# Patient Record
Sex: MALE | Race: WHITE | NOT HISPANIC OR LATINO | Employment: UNEMPLOYED | URBAN - METROPOLITAN AREA
[De-identification: names, ages, dates, MRNs, and addresses within clinical notes are randomized per-mention and may not be internally consistent; named-entity substitution may affect disease eponyms.]

---

## 2017-08-28 ENCOUNTER — INITIAL CONSULT (OUTPATIENT)
Dept: HEMATOLOGY/ONCOLOGY | Facility: CLINIC | Age: 47
End: 2017-08-28
Payer: MEDICARE

## 2017-08-28 ENCOUNTER — LAB VISIT (OUTPATIENT)
Dept: LAB | Facility: HOSPITAL | Age: 47
End: 2017-08-28
Attending: INTERNAL MEDICINE
Payer: MEDICARE

## 2017-08-28 VITALS
OXYGEN SATURATION: 97 % | TEMPERATURE: 98 F | WEIGHT: 93.94 LBS | DIASTOLIC BLOOD PRESSURE: 70 MMHG | SYSTOLIC BLOOD PRESSURE: 102 MMHG | HEART RATE: 86 BPM

## 2017-08-28 DIAGNOSIS — D64.9 ANEMIA, UNSPECIFIED TYPE: Primary | ICD-10-CM

## 2017-08-28 DIAGNOSIS — E11.8 TYPE 2 DIABETES MELLITUS WITH COMPLICATION, UNSPECIFIED LONG TERM INSULIN USE STATUS: ICD-10-CM

## 2017-08-28 DIAGNOSIS — D64.9 ANEMIA, UNSPECIFIED TYPE: ICD-10-CM

## 2017-08-28 PROBLEM — E11.9 DIABETES MELLITUS: Status: ACTIVE | Noted: 2017-08-28

## 2017-08-28 LAB
ALBUMIN SERPL BCP-MCNC: 3.6 G/DL
ALP SERPL-CCNC: 64 U/L
ALT SERPL W/O P-5'-P-CCNC: 13 U/L
ANION GAP SERPL CALC-SCNC: 6 MMOL/L
AST SERPL-CCNC: 16 U/L
BASOPHILS # BLD AUTO: 0.05 K/UL
BASOPHILS NFR BLD: 1.2 %
BILIRUB SERPL-MCNC: 0.4 MG/DL
BUN SERPL-MCNC: 16 MG/DL
CALCIUM SERPL-MCNC: 9.8 MG/DL
CHLORIDE SERPL-SCNC: 106 MMOL/L
CO2 SERPL-SCNC: 27 MMOL/L
CREAT SERPL-MCNC: 1.3 MG/DL
DIFFERENTIAL METHOD: ABNORMAL
EOSINOPHIL # BLD AUTO: 0.2 K/UL
EOSINOPHIL NFR BLD: 5.2 %
ERYTHROCYTE [DISTWIDTH] IN BLOOD BY AUTOMATED COUNT: 15.9 %
EST. GFR  (AFRICAN AMERICAN): >60 ML/MIN/1.73 M^2
EST. GFR  (NON AFRICAN AMERICAN): >60 ML/MIN/1.73 M^2
FERRITIN SERPL-MCNC: 6 NG/ML
GLUCOSE SERPL-MCNC: 101 MG/DL
HCT VFR BLD AUTO: 31.9 %
HGB BLD-MCNC: 10 G/DL
IRON SERPL-MCNC: 27 UG/DL
LYMPHOCYTES # BLD AUTO: 1.3 K/UL
LYMPHOCYTES NFR BLD: 30.8 %
MCH RBC QN AUTO: 26 PG
MCHC RBC AUTO-ENTMCNC: 31.3 G/DL
MCV RBC AUTO: 83 FL
MONOCYTES # BLD AUTO: 0.5 K/UL
MONOCYTES NFR BLD: 11.1 %
NEUTROPHILS # BLD AUTO: 2.2 K/UL
NEUTROPHILS NFR BLD: 51.7 %
PLATELET # BLD AUTO: 199 K/UL
PMV BLD AUTO: 10.8 FL
POTASSIUM SERPL-SCNC: 4.1 MMOL/L
PROT SERPL-MCNC: 7.5 G/DL
RBC # BLD AUTO: 3.85 M/UL
RETICS/RBC NFR AUTO: 1.1 %
SATURATED IRON: 6 %
SODIUM SERPL-SCNC: 139 MMOL/L
TOTAL IRON BINDING CAPACITY: 487 UG/DL
TRANSFERRIN SERPL-MCNC: 329 MG/DL
WBC # BLD AUTO: 4.22 K/UL

## 2017-08-28 PROCEDURE — 99999 PR PBB SHADOW E&M-NEW PATIENT-LVL III: CPT | Mod: PBBFAC,,, | Performed by: INTERNAL MEDICINE

## 2017-08-28 PROCEDURE — 82728 ASSAY OF FERRITIN: CPT

## 2017-08-28 PROCEDURE — 82525 ASSAY OF COPPER: CPT

## 2017-08-28 PROCEDURE — 99204 OFFICE O/P NEW MOD 45 MIN: CPT | Mod: S$PBB,,, | Performed by: INTERNAL MEDICINE

## 2017-08-28 PROCEDURE — 85025 COMPLETE CBC W/AUTO DIFF WBC: CPT

## 2017-08-28 PROCEDURE — 85045 AUTOMATED RETICULOCYTE COUNT: CPT

## 2017-08-28 PROCEDURE — 84165 PROTEIN E-PHORESIS SERUM: CPT | Mod: 26,,, | Performed by: PATHOLOGY

## 2017-08-28 PROCEDURE — 80053 COMPREHEN METABOLIC PANEL: CPT

## 2017-08-28 PROCEDURE — 84630 ASSAY OF ZINC: CPT

## 2017-08-28 PROCEDURE — 83540 ASSAY OF IRON: CPT

## 2017-08-28 PROCEDURE — 4010F ACE/ARB THERAPY RXD/TAKEN: CPT | Mod: ,,, | Performed by: INTERNAL MEDICINE

## 2017-08-28 PROCEDURE — 83520 IMMUNOASSAY QUANT NOS NONAB: CPT

## 2017-08-28 PROCEDURE — 84165 PROTEIN E-PHORESIS SERUM: CPT

## 2017-08-28 PROCEDURE — 82747 ASSAY OF FOLIC ACID RBC: CPT

## 2017-08-28 RX ORDER — TRIAMCINOLONE ACETONIDE 1 MG/G
OINTMENT TOPICAL
COMMUNITY
Start: 2017-07-31 | End: 2018-04-11

## 2017-08-28 RX ORDER — ATORVASTATIN CALCIUM 10 MG/1
10 TABLET, FILM COATED ORAL DAILY
COMMUNITY
Start: 2017-07-19

## 2017-08-28 RX ORDER — OMEPRAZOLE 20 MG/1
20 CAPSULE, DELAYED RELEASE ORAL 2 TIMES DAILY
COMMUNITY
Start: 2017-08-03 | End: 2018-02-09 | Stop reason: ALTCHOICE

## 2017-08-28 RX ORDER — LIRAGLUTIDE 6 MG/ML
1.8 INJECTION SUBCUTANEOUS DAILY
COMMUNITY
Start: 2017-08-25 | End: 2018-07-11 | Stop reason: ALTCHOICE

## 2017-08-28 RX ORDER — INSULIN GLARGINE 100 [IU]/ML
15 INJECTION, SOLUTION SUBCUTANEOUS NIGHTLY
COMMUNITY
End: 2017-11-15 | Stop reason: ALTCHOICE

## 2017-08-28 RX ORDER — DOXAZOSIN 2 MG/1
2 TABLET ORAL DAILY
COMMUNITY
Start: 2017-08-24 | End: 2018-07-11 | Stop reason: DRUGHIGH

## 2017-08-28 RX ORDER — INSULIN LISPRO 100 [IU]/ML
INJECTION, SOLUTION INTRAVENOUS; SUBCUTANEOUS
COMMUNITY
End: 2019-04-16

## 2017-08-28 RX ORDER — LISINOPRIL 5 MG/1
5 TABLET ORAL DAILY
COMMUNITY
Start: 2017-07-10 | End: 2018-10-11

## 2017-08-28 RX ORDER — PEN NEEDLE, DIABETIC 31 GX5/16"
NEEDLE, DISPOSABLE MISCELLANEOUS
COMMUNITY
Start: 2017-06-13 | End: 2021-03-28

## 2017-08-28 NOTE — LETTER
August 28, 2017      Emil Lopez MD  [address] [place] [address] [address]  Tony C[place]ERO [id number]           [facility]  [address] [place], [place] [address] [address]  Phone: [phone]          Patient: Jarrett Odonnell Jr.   MR Number: [id number]   YOB: 1970   Date of Visit: 8/28/2017       Dear Dr. Emil Lopez:    Thank you for referring Jarrett Odonnell to me for evaluation. Attached you will find relevant portions of my assessment and plan of care.    If you have questions, please do not hesitate to call me. I look forward to following Jarrett Odonnell along with you.    Sincerely,    Emani Rothamn MD    Enclosure  CC:  No Recipients    If you would like to receive this communication electronically, please contact [email or web address] or [phone] to request more information on [facility] access.    For providers and/or their staff who would like to refer a patient to [facility], please contact us through our one-stop-shop provider referral line, Patrica Corley, at [phone].    If you feel you have received this communication in error or would no longer like to receive these types of communications, please e-mail [email or web address]

## 2017-08-28 NOTE — PROGRESS NOTES
Subjective:       Patient ID: Jarrett Odonnell Jr. is a 46 y.o. male.    Chief Complaint: Initial Visit    HPI   REASON FOR CONSULTATION: Anemia  REFERRING PHYSICIAN : Emil Lopez M.D.    Patient is a pleasant 46-year-old gentleman with past medical history of type 2 diabetes mellitus, CK D3, hypertension seen today in consultation for anemia.  He is accompanied by his mother.  She reports he was recently made aware of anemia.  No prior history of anemia according to patient.  No history of blood transfusions.  He reports fatigue for the past 2 months.  No lightheadedness, dizziness, shortness of breath or chest pain.  He reports he underwent a screening colonoscopy in 2010 which was  remarkable for benign polyps.  No melena, hematochezia change in bowel habits.  Appetite and weight stable.  No fevers or night sweats.  CBC from 8/24/2017 reveals a white blood cell count of 4100/MM 3 hemoglobin of 8.9 g/dL hematocrit of 28.6% MCV of 82.4 and a platelet count of 190 3K . He is here for further evaluation    PAST MEDICAL HISTORY type 2 diabetes mellitus with diabetic chronic kidney disease, hypertensive chronic kidney disease, chronic kidney disease stage III      PAST SURGICAL HISTORY : Orchiectomy     SOCIAL HISTORY : No  history of tobacco use.  No history of ETOH use.  He lives with his parents.  He attends [employer] school.  No smoke or alcohol use      Review of Systems   Constitutional: Positive for fatigue. Negative for appetite change, fever and unexpected weight change.   HENT: Negative for mouth sores.    Eyes: Negative for visual disturbance.   Respiratory: Negative for cough and shortness of breath.    Cardiovascular: Negative for chest pain.   Gastrointestinal: Negative for abdominal pain and diarrhea.   Genitourinary: Negative for frequency.   Musculoskeletal: Negative for back pain.   Skin: Negative for rash.   Neurological: Negative for headaches.   Hematological: Negative for adenopathy.    Psychiatric/Behavioral: The patient is not nervous/anxious.        Objective:       Vitals:    08/28/17 1054   BP: 102/70   BP Location: Left arm   Patient Position: Sitting   BP Method: Medium (Manual)   Pulse: 86   Temp: 98 °F (36.7 °C)   TempSrc: Oral   SpO2: 97%   Weight: 42.6 kg (93 lb 14.7 oz)         Physical Exam   Constitutional: He is oriented to person, place, and time. He appears well-developed and well-nourished.   HENT:   Head: Normocephalic.   Mouth/Throat: Oropharynx is clear and moist. No oropharyngeal exudate.   Eyes: Conjunctivae are normal. No scleral icterus.   Neck: Normal range of motion. Neck supple. No thyromegaly present.   Cardiovascular: Normal rate, regular rhythm and normal heart sounds.    No murmur heard.  Pulmonary/Chest: Effort normal and breath sounds normal. He has no wheezes. He has no rales.   Abdominal: Soft. Bowel sounds are normal. He exhibits no distension and no mass. There is no hepatosplenomegaly. There is no tenderness. There is no rebound and no guarding.   Musculoskeletal: Normal range of motion. He exhibits no edema.   Lymphadenopathy:     He has no cervical adenopathy.     He has no axillary adenopathy.        Right: No supraclavicular adenopathy present.        Left: No supraclavicular adenopathy present.   Neurological: He is alert and oriented to person, place, and time. No cranial nerve deficit.   Skin: No rash noted. No erythema.   Psychiatric: He has a normal mood and affect.       Outside Facility labs Reviewed.  8/24/2017 BUN 17 Creatinine 1.27 Sodium 140 Potassium 3.7 Chloride 106 Calcium 8.7 Phosphorous 2.3 Hemoglobin 8.9 Hematocrit 28.6% plt ct t 190K from   3/7/20/17 Hemoglobin 10.3 g/dL  Hematocrit 31.7%    Assessment:       1. Anemia, unspecified type    2. Type 2 diabetes mellitus with complication, unspecified long term insulin use status        Plan:   In summary, 46-year-old with history type 2 diabetes mellitus with diabetic chronic kidney  disease,Chronic kidney disease stage III with  anemia normocytic of unknown etiology and duration. According to  available records, patient has a mild anemia dating back to at least 2016 with a hemoglobin 11.1g/dl  which has been trending to recent hemoglobin level level of 8.9 g/dL.  Biochemical profile reveals a  relatively  stable BUN and creatinine level with a creatinine level ranging in the 1.2-1.4 mg/dL. Plan testing today including iron studies, SPEP, serum free light chains, zinc, copper, red blood cell folate, CBC, CMP and reticulocyte count. In  addition, plan to evaluate the peripheral blood smear for any abnormalities. He  will follow-up approximate 2 weeks to review the results of the workup.  All questions posed answered to patient's satisfaction.    Thank you for allowing me to participate care of your patient    Cc Emil Montejo M.D.

## 2017-08-29 LAB
ALBUMIN SERPL ELPH-MCNC: 3.94 G/DL
ALPHA1 GLOB SERPL ELPH-MCNC: 0.34 G/DL
ALPHA2 GLOB SERPL ELPH-MCNC: 0.91 G/DL
B-GLOBULIN SERPL ELPH-MCNC: 0.86 G/DL
GAMMA GLOB SERPL ELPH-MCNC: 1.26 G/DL
KAPPA LC SER QL IA: 4.7 MG/DL
KAPPA LC/LAMBDA SER IA: 1.4
LAMBDA LC SER QL IA: 3.35 MG/DL
PATHOLOGIST INTERPRETATION SPE: NORMAL
PROT SERPL-MCNC: 7.3 G/DL

## 2017-08-30 LAB
FOLATE RBC-MCNC: 696 NG/ML
ZINC SERPL-MCNC: 62 UG/DL (ref 60–130)

## 2017-08-31 LAB — COPPER SERPL-MCNC: 1239 UG/L (ref 665–1480)

## 2017-09-15 ENCOUNTER — OFFICE VISIT (OUTPATIENT)
Dept: HEMATOLOGY/ONCOLOGY | Facility: CLINIC | Age: 47
End: 2017-09-15
Payer: MEDICARE

## 2017-09-15 ENCOUNTER — TELEPHONE (OUTPATIENT)
Dept: HEMATOLOGY/ONCOLOGY | Facility: CLINIC | Age: 47
End: 2017-09-15

## 2017-09-15 VITALS
DIASTOLIC BLOOD PRESSURE: 48 MMHG | SYSTOLIC BLOOD PRESSURE: 100 MMHG | WEIGHT: 93.56 LBS | TEMPERATURE: 98 F | OXYGEN SATURATION: 97 % | HEART RATE: 84 BPM

## 2017-09-15 DIAGNOSIS — D50.9 IRON DEFICIENCY ANEMIA, UNSPECIFIED IRON DEFICIENCY ANEMIA TYPE: Primary | ICD-10-CM

## 2017-09-15 PROCEDURE — 99213 OFFICE O/P EST LOW 20 MIN: CPT | Mod: S$PBB,,, | Performed by: INTERNAL MEDICINE

## 2017-09-15 PROCEDURE — 99213 OFFICE O/P EST LOW 20 MIN: CPT | Mod: PBBFAC | Performed by: INTERNAL MEDICINE

## 2017-09-15 PROCEDURE — 99999 PR PBB SHADOW E&M-EST. PATIENT-LVL III: CPT | Mod: PBBFAC,,, | Performed by: INTERNAL MEDICINE

## 2017-09-15 RX ORDER — HEPARIN 100 UNIT/ML
500 SYRINGE INTRAVENOUS
Status: CANCELLED | OUTPATIENT
Start: 2017-09-19

## 2017-09-15 RX ORDER — SODIUM CHLORIDE 0.9 % (FLUSH) 0.9 %
10 SYRINGE (ML) INJECTION
Status: CANCELLED | OUTPATIENT
Start: 2017-09-26

## 2017-09-15 RX ORDER — HEPARIN 100 UNIT/ML
500 SYRINGE INTRAVENOUS
Status: CANCELLED | OUTPATIENT
Start: 2017-09-26

## 2017-09-15 RX ORDER — SODIUM CHLORIDE 0.9 % (FLUSH) 0.9 %
10 SYRINGE (ML) INJECTION
Status: CANCELLED | OUTPATIENT
Start: 2017-09-19

## 2017-09-15 NOTE — Clinical Note
Follow-up with Dr. Rodriguez- KELI and lost to follow-up Cbc, FE studies proir to f/u  Infectafer x2 Note from Dr. Pfeiffer ( faxed?)

## 2017-09-15 NOTE — PROGRESS NOTES
Subjective:       Patient ID: Jarrett Odonnell Jr. is a 47 y.o. male.    Chief Complaint: Follow-up    HPI   Diagnosis: KELI      47-year-old gentleman with past medical history of type 2 diabetes mellitus, CK D3, hypertension seen today for f/u for anemia  He is accompanied by his mother.  She reports he was recently made aware of anemia.  No prior history of anemia according to patient.  No history of blood transfusions.  He reports fatigue for the past 2 months.  No lightheadedness, dizziness, shortness of breath or chest pain.  He reports he underwent a screening colonoscopy in 2010 which was  remarkable for benign polyps.  No melena, hematochezia change in bowel habits.  Appetite and weight stable.  No fevers or night sweats.  CBC from 8/24/2017 reveals a white blood cell count of 4100/MM 3 hemoglobin of 8.9 g/dL hematocrit of 28.6% MCV of 82.4 and a platelet count of 190 3K .       Follow-up with Vascular surgery planned 9/19/2017    Today, he is doing well  Less fatigued( according to mother)  No SOB/CP/N/V  No melena, hematochezia or change in bowel habits       Fe parameters abnl- Ferritin 6  PAST MEDICAL HISTORY type 2 diabetes mellitus with diabetic chronic kidney disease, hypertensive chronic kidney disease, chronic kidney disease stage III      PAST SURGICAL HISTORY : Orchiectomy     SOCIAL HISTORY : No  history of tobacco use.  No history of ETOH use.  He lives with his parents.  He attends [employer].  No smoke or alcohol use      Review of Systems   Constitutional: Positive for fatigue. Negative for appetite change, fever and unexpected weight change.   HENT: Negative for mouth sores.    Eyes: Negative for visual disturbance.   Respiratory: Negative for cough and shortness of breath.    Cardiovascular: Negative for chest pain.   Gastrointestinal: Negative for abdominal pain and diarrhea.   Genitourinary: Negative for frequency.   Musculoskeletal: Negative for back pain.   Skin: Negative for  rash.   Neurological: Negative for headaches.   Hematological: Negative for adenopathy.   Psychiatric/Behavioral: The patient is not nervous/anxious.        Objective:       Vitals:    09/15/17 0957 09/15/17 1003   BP: (!) 101/49 (!) 100/48   BP Location: Right arm Right arm   Patient Position: Sitting Sitting   BP Method: Medium (Automatic) Medium (Manual)   Pulse: 84    Temp: 98.2 °F (36.8 °C)    TempSrc: Oral    SpO2: 97%    Weight: 42.4 kg (93 lb 9.4 oz)          Physical Exam   Constitutional: He is oriented to person, place, and time. He appears well-developed and well-nourished.   HENT:   Head: Normocephalic.   Mouth/Throat: Oropharynx is clear and moist. No oropharyngeal exudate.   Eyes: Conjunctivae are normal. No scleral icterus.   Neck: Normal range of motion. Neck supple. No thyromegaly present.   Cardiovascular: Normal rate, regular rhythm and normal heart sounds.    No murmur heard.  Pulmonary/Chest: Effort normal and breath sounds normal. He has no wheezes. He has no rales.   Abdominal: Soft. Bowel sounds are normal. He exhibits no distension and no mass. There is no hepatosplenomegaly. There is no tenderness. There is no rebound and no guarding.   Musculoskeletal: Normal range of motion. He exhibits no edema.   Lymphadenopathy:     He has no cervical adenopathy.     He has no axillary adenopathy.        Right: No supraclavicular adenopathy present.        Left: No supraclavicular adenopathy present.   Neurological: He is alert and oriented to person, place, and time. No cranial nerve deficit.   Skin: No rash noted. No erythema.   Psychiatric: He has a normal mood and affect.       Outside Facility labs Reviewed.  8/24/2017 BUN 17 Creatinine 1.27 Sodium 140 Potassium 3.7 Chloride 106 Calcium 8.7 Phosphorous 2.3 Hemoglobin 8.9 Hematocrit 28.6% plt ct t 190K from   3/7/20/17 Hemoglobin 10.3 g/dL  Hematocrit 31.7%    SPEP-nl    Results for JOHN MCGRATH JR. (MRN [id number]) as of 9/15/2017 10:06   Ref.  Range 8/28/2017 12:00   [place] Free Light Chains Latest Ref Range: 0.33 - 1.94 mg/dL 4.70 (H)   Lambda Free Light Chains Latest Ref Range: 0.57 - 2.63 mg/dL 3.35 (H)   Kappa/Lambda FLC Ratio Latest Ref Range: 0.26 - 1.65  1.40     Lab Results   Component Value Date    IRON 27 (L) 08/28/2017    TIBC 487 (H) 08/28/2017    FERRITIN 6 (L) 08/28/2017     Results for JOHN MCGRATH JRPeterson (MRN [id number]) as of 9/15/2017 10:06   Ref. Range 8/28/2017 12:00   Copper Latest Ref Range: 665 - 1480 ug/L 1239   Differential Method Unknown Automated   RBC Folate Latest Ref Range: 280 - 791 ng/mL 696   Zinc, Serum-ALT Latest Ref Range: 60 - 130 ug/dL 62     Lab Results   Component Value Date    WBC 4.22 08/28/2017    HGB 10.0 (L) 08/28/2017    HCT 31.9 (L) 08/28/2017    MCV 83 08/28/2017     08/28/2017       Assessment:       1. Iron deficiency anemia, unspecified iron deficiency anemia type        Plan:   46-year-old with history type 2 diabetes mellitus with diabetic chronic kidney disease,Chronic kidney disease stage III   Pt has a mild anemia dating back to at least 2016 with a hemoglobin 11.1g/dl  which has been trending to recent hemoglobin level level of 8.9 g/dL.    Biochemical profile reveals a  relatively  stable BUN and creatinine level with a creatinine level ranging in the 1.2-1.4 mg/dL.  Fe parameters reveal KELI  Plan IV Fe therapy with Injectafer  Pt followed by Dr. Rodriguez- last f/u 2014(Follow-up colonoscopy planned in 1yr)    Follow-up with Dr. Rodriguez for GI eval    Follow-up in 2mos with cbc,Fe studies prior to f/u     Cc Emil Montejo M.D.        Alpesh Rodriguez II, MD

## 2017-09-15 NOTE — TELEPHONE ENCOUNTER
Called pt's mother & gave her the follow up appt for  with  on October 17th at 11am, she voiced understanding.

## 2017-09-20 ENCOUNTER — INFUSION (OUTPATIENT)
Dept: INFUSION THERAPY | Facility: HOSPITAL | Age: 47
End: 2017-09-20
Attending: INTERNAL MEDICINE
Payer: MEDICARE

## 2017-09-20 VITALS — RESPIRATION RATE: 17 BRPM | DIASTOLIC BLOOD PRESSURE: 71 MMHG | HEART RATE: 78 BPM | SYSTOLIC BLOOD PRESSURE: 130 MMHG

## 2017-09-20 DIAGNOSIS — D50.9 IRON DEFICIENCY ANEMIA, UNSPECIFIED IRON DEFICIENCY ANEMIA TYPE: Primary | ICD-10-CM

## 2017-09-20 PROCEDURE — 96365 THER/PROPH/DIAG IV INF INIT: CPT

## 2017-09-20 PROCEDURE — 63600175 PHARM REV CODE 636 W HCPCS: Performed by: INTERNAL MEDICINE

## 2017-09-20 PROCEDURE — 25000003 PHARM REV CODE 250: Performed by: INTERNAL MEDICINE

## 2017-09-20 RX ADMIN — FERRIC CARBOXYMALTOSE INJECTION 750 MG: 50 INJECTION, SOLUTION INTRAVENOUS at 01:09

## 2017-09-20 NOTE — PLAN OF CARE
Problem: Patient Care Overview  Goal: Plan of Care Review  Outcome: Ongoing (interventions implemented as appropriate)  Patient received Injectafer. Tolerated well. No reactions noted during visit. VSS. Patient received discharge instructions and verbalized understanding.

## 2017-09-27 ENCOUNTER — INFUSION (OUTPATIENT)
Dept: INFUSION THERAPY | Facility: HOSPITAL | Age: 47
End: 2017-09-27
Attending: INTERNAL MEDICINE
Payer: MEDICARE

## 2017-09-27 VITALS — HEART RATE: 85 BPM | DIASTOLIC BLOOD PRESSURE: 72 MMHG | RESPIRATION RATE: 16 BRPM | SYSTOLIC BLOOD PRESSURE: 124 MMHG

## 2017-09-27 DIAGNOSIS — D50.9 IRON DEFICIENCY ANEMIA, UNSPECIFIED IRON DEFICIENCY ANEMIA TYPE: Primary | ICD-10-CM

## 2017-09-27 PROCEDURE — 96374 THER/PROPH/DIAG INJ IV PUSH: CPT

## 2017-09-27 PROCEDURE — 25000003 PHARM REV CODE 250: Performed by: INTERNAL MEDICINE

## 2017-09-27 PROCEDURE — 63600175 PHARM REV CODE 636 W HCPCS: Performed by: INTERNAL MEDICINE

## 2017-09-27 RX ADMIN — FERRIC CARBOXYMALTOSE INJECTION 750 MG: 50 INJECTION, SOLUTION INTRAVENOUS at 01:09

## 2017-11-13 ENCOUNTER — LAB VISIT (OUTPATIENT)
Dept: LAB | Facility: HOSPITAL | Age: 47
End: 2017-11-13
Attending: INTERNAL MEDICINE
Payer: MEDICARE

## 2017-11-13 DIAGNOSIS — D50.9 IRON DEFICIENCY ANEMIA, UNSPECIFIED IRON DEFICIENCY ANEMIA TYPE: ICD-10-CM

## 2017-11-13 LAB
BASOPHILS # BLD AUTO: 0.02 K/UL
BASOPHILS NFR BLD: 0.7 %
DIFFERENTIAL METHOD: ABNORMAL
EOSINOPHIL # BLD AUTO: 0.1 K/UL
EOSINOPHIL NFR BLD: 4.5 %
ERYTHROCYTE [DISTWIDTH] IN BLOOD BY AUTOMATED COUNT: 20.4 %
FERRITIN SERPL-MCNC: 335 NG/ML
HCT VFR BLD AUTO: 36.7 %
HGB BLD-MCNC: 12.3 G/DL
IRON SERPL-MCNC: 94 UG/DL
LYMPHOCYTES # BLD AUTO: 0.8 K/UL
LYMPHOCYTES NFR BLD: 28 %
MCH RBC QN AUTO: 31.4 PG
MCHC RBC AUTO-ENTMCNC: 33.5 G/DL
MCV RBC AUTO: 94 FL
MONOCYTES # BLD AUTO: 0.3 K/UL
MONOCYTES NFR BLD: 9.1 %
NEUTROPHILS # BLD AUTO: 1.7 K/UL
NEUTROPHILS NFR BLD: 57.7 %
PLATELET # BLD AUTO: 150 K/UL
PMV BLD AUTO: 10.9 FL
RBC # BLD AUTO: 3.92 M/UL
SATURATED IRON: 36 %
TOTAL IRON BINDING CAPACITY: 263 UG/DL
TRANSFERRIN SERPL-MCNC: 178 MG/DL
WBC # BLD AUTO: 2.86 K/UL

## 2017-11-13 PROCEDURE — 36415 COLL VENOUS BLD VENIPUNCTURE: CPT

## 2017-11-13 PROCEDURE — 85025 COMPLETE CBC W/AUTO DIFF WBC: CPT

## 2017-11-13 PROCEDURE — 82728 ASSAY OF FERRITIN: CPT

## 2017-11-13 PROCEDURE — 83540 ASSAY OF IRON: CPT

## 2017-11-15 ENCOUNTER — OFFICE VISIT (OUTPATIENT)
Dept: HEMATOLOGY/ONCOLOGY | Facility: CLINIC | Age: 47
End: 2017-11-15
Payer: MEDICARE

## 2017-11-15 VITALS
DIASTOLIC BLOOD PRESSURE: 78 MMHG | SYSTOLIC BLOOD PRESSURE: 123 MMHG | WEIGHT: 90.19 LBS | OXYGEN SATURATION: 97 % | HEART RATE: 71 BPM | TEMPERATURE: 98 F

## 2017-11-15 DIAGNOSIS — D50.9 IRON DEFICIENCY ANEMIA, UNSPECIFIED IRON DEFICIENCY ANEMIA TYPE: Primary | ICD-10-CM

## 2017-11-15 PROCEDURE — 99999 PR PBB SHADOW E&M-EST. PATIENT-LVL III: CPT | Mod: PBBFAC,,, | Performed by: INTERNAL MEDICINE

## 2017-11-15 PROCEDURE — 99213 OFFICE O/P EST LOW 20 MIN: CPT | Mod: S$PBB,,, | Performed by: INTERNAL MEDICINE

## 2017-11-15 PROCEDURE — 99213 OFFICE O/P EST LOW 20 MIN: CPT | Mod: PBBFAC | Performed by: INTERNAL MEDICINE

## 2017-11-15 RX ORDER — INSULIN GLARGINE 100 [IU]/ML
INJECTION, SOLUTION SUBCUTANEOUS
COMMUNITY
Start: 2017-08-28 | End: 2017-11-15 | Stop reason: ALTCHOICE

## 2017-11-15 NOTE — PROGRESS NOTES
Subjective:       Patient ID: Jarrett Odonnell Jr. is a 47 y.o. male.    Chief Complaint: Follow-up    HPI   Diagnosis: KELI      47-year-old gentleman with past medical history of type 2 diabetes mellitus, CK D3, hypertension seen today for f/u for KELI  He is accompanied by his mother.  She reports he was recently made aware of anemia.  No prior history of anemia according to patient.  No history of blood transfusions.  He reports fatigue for the past 2 months.  No lightheadedness, dizziness, shortness of breath or chest pain.  He reports he underwent a screening colonoscopy in 2010 which was  remarkable for benign polyps.  No melena, hematochezia change in bowel habits.  Appetite and weight stable.  No fevers or night sweats.  CBC from 8/24/2017 reveals a white blood cell count of 4100/MM 3 hemoglobin of 8.9 g/dL hematocrit of 28.6% MCV of 82.4 and a platelet count of 190 3K .           Today, he is doing well  Less fatigued  No SOB/CP/N/V  No melena, hematochezia or change in bowel habits     S/p Injectafer completed 9/2017    Fe parameters improved    Pt s/p GI eval 10/2017  EGD- nl esophagus, gastric polyp  Colonoscopy -hmds, o/w unremarkable  PAST MEDICAL HISTORY type 2 diabetes mellitus with diabetic chronic kidney disease, hypertensive chronic kidney disease, chronic kidney disease stage III      PAST SURGICAL HISTORY : Orchiectomy     SOCIAL HISTORY : No  history of tobacco use.  No history of ETOH use.  He lives with his parents.  He attends [employer] in 2014[employer] school.  No smoke or alcohol use      Review of Systems   Constitutional: Positive for fatigue. Negative for appetite change, fever and unexpected weight change.   HENT: Negative for mouth sores.    Eyes: Negative for visual disturbance.   Respiratory: Negative for cough and shortness of breath.    Cardiovascular: Negative for chest pain.   Gastrointestinal: Negative for abdominal pain and diarrhea.   Genitourinary: Negative for frequency.    Musculoskeletal: Negative for back pain.   Skin: Negative for rash.   Neurological: Negative for headaches.   Hematological: Negative for adenopathy.   Psychiatric/Behavioral: The patient is not nervous/anxious.        Objective:       Vitals:    11/15/17 1035   BP: 123/78   BP Location: Left arm   Patient Position: Sitting   BP Method: Medium (Automatic)   Pulse: 71   Temp: 98 °F (36.7 °C)   TempSrc: Oral   SpO2: 97%   Weight: 40.9 kg (90 lb 2.7 oz)         Physical Exam   Constitutional: He is oriented to person, place, and time. He appears well-developed and well-nourished.   HENT:   Head: Normocephalic.   Mouth/Throat: Oropharynx is clear and moist. No oropharyngeal exudate.   Eyes: Conjunctivae are normal. No scleral icterus.   Neck: Normal range of motion. Neck supple. No thyromegaly present.   Cardiovascular: Normal rate, regular rhythm and normal heart sounds.    No murmur heard.  Pulmonary/Chest: Effort normal and breath sounds normal. He has no wheezes. He has no rales.   Abdominal: Soft. Bowel sounds are normal. He exhibits no distension and no mass. There is no hepatosplenomegaly. There is no tenderness. There is no rebound and no guarding.   Musculoskeletal: Normal range of motion. He exhibits no edema.   Lymphadenopathy:     He has no cervical adenopathy.     He has no axillary adenopathy.        Right: No supraclavicular adenopathy present.        Left: No supraclavicular adenopathy present.   Neurological: He is alert and oriented to person, place, and time. No cranial nerve deficit.   Skin: No rash noted. No erythema.   Psychiatric: He has a normal mood and affect.       Outside Facility labs Reviewed.  8/24/2017 BUN 17 Creatinine 1.27 Sodium 140 Potassium 3.7 Chloride 106 Calcium 8.7 Phosphorous 2.3 Hemoglobin 8.9 Hematocrit 28.6% plt ct t 190K from   3/7/20/17 Hemoglobin 10.3 g/dL  Hematocrit 31.7%    SPEP-nl    Results for JOHN MCGRATH JR. (MRN [id number]) as of 9/15/2017 10:06   Ref. Range  8/28/2017 12:00   [place] Free Light Chains Latest Ref Range: 0.33 - 1.94 mg/dL 4.70 (H)   Lambda Free Light Chains Latest Ref Range: 0.57 - 2.63 mg/dL 3.35 (H)   Kappa/Lambda FLC Ratio Latest Ref Range: 0.26 - 1.65  1.40     Lab Results   Component Value Date    IRON 94 11/13/2017    TIBC 263 11/13/2017    FERRITIN 335 (H) 11/13/2017     Results for JOHN MCGRATH  (MRN [id number]) as of 9/15/2017 10:06   Ref. Range 8/28/2017 12:00   Copper Latest Ref Range: 665 - 1480 ug/L 1239   Differential Method Unknown Automated   RBC Folate Latest Ref Range: 280 - 791 ng/mL 696   Zinc, Serum-ALT Latest Ref Range: 60 - 130 ug/dL 62     Lab Results   Component Value Date    WBC 2.86 (L) 11/13/2017    HGB 12.3 (L) 11/13/2017    HCT 36.7 (L) 11/13/2017    MCV 94 11/13/2017     11/13/2017       Assessment:       1. Iron deficiency anemia, unspecified iron deficiency anemia type        Plan:   46-year-old with history type 2 diabetes mellitus with diabetic chronic kidney disease,Chronic kidney disease stage III   Pt has a mild anemia dating back to at least 2016 with a hemoglobin 11.1g/dl  which has been trending to recent hemoglobin level level of 8.9 g/dL.    Biochemical profile reveals a  relatively  stable BUN and creatinine level with a creatinine level ranging in the 1.2-1.4 mg/dL.    S/p IV Fe therapy with Injectafer  Fe parameters improved  Followed by GI -Dr. Rodriguez  S/p GI eval- no bleeding source  Cont to monitor     Follow-up in 2mos with cbc,Fe studies prior to f/u     Cc Emil Montejo M.D.        Alpesh Rodriguez II, MD

## 2018-01-12 ENCOUNTER — LAB VISIT (OUTPATIENT)
Dept: LAB | Facility: HOSPITAL | Age: 48
End: 2018-01-12
Attending: INTERNAL MEDICINE
Payer: MEDICARE

## 2018-01-12 DIAGNOSIS — D50.9 IRON DEFICIENCY ANEMIA, UNSPECIFIED IRON DEFICIENCY ANEMIA TYPE: ICD-10-CM

## 2018-01-12 LAB
BASOPHILS # BLD AUTO: 0.03 K/UL
BASOPHILS NFR BLD: 0.7 %
DIFFERENTIAL METHOD: ABNORMAL
EOSINOPHIL # BLD AUTO: 0.2 K/UL
EOSINOPHIL NFR BLD: 3.6 %
ERYTHROCYTE [DISTWIDTH] IN BLOOD BY AUTOMATED COUNT: 13.5 %
FERRITIN SERPL-MCNC: 409 NG/ML
HCT VFR BLD AUTO: 34.4 %
HGB BLD-MCNC: 11.7 G/DL
IRON SERPL-MCNC: 79 UG/DL
LYMPHOCYTES # BLD AUTO: 1.1 K/UL
LYMPHOCYTES NFR BLD: 24.7 %
MCH RBC QN AUTO: 33.5 PG
MCHC RBC AUTO-ENTMCNC: 34 G/DL
MCV RBC AUTO: 99 FL
MONOCYTES # BLD AUTO: 0.5 K/UL
MONOCYTES NFR BLD: 11.7 %
NEUTROPHILS # BLD AUTO: 2.6 K/UL
NEUTROPHILS NFR BLD: 59.3 %
PLATELET # BLD AUTO: 174 K/UL
PMV BLD AUTO: 11.5 FL
RBC # BLD AUTO: 3.49 M/UL
SATURATED IRON: 30 %
TOTAL IRON BINDING CAPACITY: 260 UG/DL
TRANSFERRIN SERPL-MCNC: 176 MG/DL
WBC # BLD AUTO: 4.45 K/UL

## 2018-01-12 PROCEDURE — 36415 COLL VENOUS BLD VENIPUNCTURE: CPT

## 2018-01-12 PROCEDURE — 83540 ASSAY OF IRON: CPT

## 2018-01-12 PROCEDURE — 85025 COMPLETE CBC W/AUTO DIFF WBC: CPT

## 2018-01-12 PROCEDURE — 82728 ASSAY OF FERRITIN: CPT

## 2018-02-09 ENCOUNTER — OFFICE VISIT (OUTPATIENT)
Dept: HEMATOLOGY/ONCOLOGY | Facility: CLINIC | Age: 48
End: 2018-02-09
Payer: MEDICARE

## 2018-02-09 VITALS
WEIGHT: 91.5 LBS | TEMPERATURE: 98 F | SYSTOLIC BLOOD PRESSURE: 110 MMHG | DIASTOLIC BLOOD PRESSURE: 64 MMHG | HEART RATE: 82 BPM | OXYGEN SATURATION: 97 %

## 2018-02-09 DIAGNOSIS — D50.9 IRON DEFICIENCY ANEMIA, UNSPECIFIED IRON DEFICIENCY ANEMIA TYPE: Primary | ICD-10-CM

## 2018-02-09 PROCEDURE — 99213 OFFICE O/P EST LOW 20 MIN: CPT | Mod: PBBFAC | Performed by: INTERNAL MEDICINE

## 2018-02-09 PROCEDURE — 99999 PR PBB SHADOW E&M-EST. PATIENT-LVL III: CPT | Mod: PBBFAC,,, | Performed by: INTERNAL MEDICINE

## 2018-02-09 PROCEDURE — 99213 OFFICE O/P EST LOW 20 MIN: CPT | Mod: S$PBB,,, | Performed by: INTERNAL MEDICINE

## 2018-02-09 NOTE — PROGRESS NOTES
Subjective:       Patient ID: Jarrett Odonnell Jr. is a 47 y.o. male.    Chief Complaint: No chief complaint on file.    HPI   Diagnosis: KELI      47-year-old gentleman with past medical history of type 2 diabetes mellitus, CK D3, hypertension seen today for f/u for KELI  He is accompanied by his mother.  She reports he was recently made aware of anemia.  No prior history of anemia according to patient.  No history of blood transfusions.  He reports fatigue for the past 2 months.  No lightheadedness, dizziness, shortness of breath or chest pain.  He reports he underwent a screening colonoscopy in 2010 which was  remarkable for benign polyps.  No melena, hematochezia change in bowel habits.  Appetite and weight stable.  No fevers or night sweats.  CBC from 8/24/2017 reveals a white blood cell count of 4100/MM 3 hemoglobin of 8.9 g/dL hematocrit of 28.6% MCV of 82.4 and a platelet count of 190 3K .       Accompanied by mother    Today, he is doing well  No fatigue  No SOB/CP  Does not crave ice  No melena, hematochezia or change in bowel habits     S/p Injectafer completed 9/2017    Fe parameters improved    Pt s/p GI eval 10/2017  EGD- nl esophagus, gastric polyp  Colonoscopy -ds, o/w unremarkable  PAST MEDICAL HISTORY type 2 diabetes mellitus with diabetic chronic kidney disease, hypertensive chronic kidney disease, chronic kidney disease stage III      PAST SURGICAL HISTORY : Orchiectomy     SOCIAL HISTORY : No  history of tobacco use.  No history of ETOH use.  He lives with his parents.  He attends [employer].  No smoke or alcohol use      Review of Systems   Constitutional: Positive for fatigue. Negative for appetite change, fever and unexpected weight change.   HENT: Negative for mouth sores.    Eyes: Negative for visual disturbance.   Respiratory: Negative for cough and shortness of breath.    Cardiovascular: Negative for chest pain.   Gastrointestinal: Negative for abdominal pain and diarrhea.    Genitourinary: Negative for frequency.   Musculoskeletal: Negative for back pain.   Skin: Negative for rash.   Neurological: Negative for headaches.   Hematological: Negative for adenopathy.   Psychiatric/Behavioral: The patient is not nervous/anxious.        Objective:       Vitals:    02/09/18 1419   BP: 110/64   BP Location: Right arm   Patient Position: Sitting   BP Method: Small (Manual)   Pulse: 82   Temp: 98.3 °F (36.8 °C)   TempSrc: Oral   SpO2: 97%   Weight: 41.5 kg (91 lb 7.9 oz)         Physical Exam   Constitutional: He is oriented to person, place, and time. He appears well-developed and well-nourished.   HENT:   Head: Normocephalic.   Mouth/Throat: Oropharynx is clear and moist. No oropharyngeal exudate.   Eyes: Conjunctivae are normal. No scleral icterus.   Neck: Normal range of motion. Neck supple. No thyromegaly present.   Cardiovascular: Normal rate, regular rhythm and normal heart sounds.    No murmur heard.  Pulmonary/Chest: Effort normal and breath sounds normal. He has no wheezes. He has no rales.   Abdominal: Soft. Bowel sounds are normal. He exhibits no distension and no mass. There is no hepatosplenomegaly. There is no tenderness. There is no rebound and no guarding.   Musculoskeletal: Normal range of motion. He exhibits no edema.   Lymphadenopathy:     He has no cervical adenopathy.     He has no axillary adenopathy.        Right: No supraclavicular adenopathy present.        Left: No supraclavicular adenopathy present.   Neurological: He is alert and oriented to person, place, and time. No cranial nerve deficit.   Skin: No rash noted. No erythema.   Psychiatric: He has a normal mood and affect.       Outside Facility labs Reviewed.  8/24/2017 BUN 17 Creatinine 1.27 Sodium 140 Potassium 3.7 Chloride 106 Calcium 8.7 Phosphorous 2.3 Hemoglobin 8.9 Hematocrit 28.6% plt ct t 190K from   3/7/20/17 Hemoglobin 10.3 g/dL  Hematocrit 31.7%    SPEP-nl    Results for JOHN MCGRATH JR. (MRN  [id number]) as of 9/15/2017 10:06   Ref. Range 8/28/2017 12:00   [place] Free Light Chains Latest Ref Range: 0.33 - 1.94 mg/dL 4.70 (H)   Lambda Free Light Chains Latest Ref Range: 0.57 - 2.63 mg/dL 3.35 (H)   Kappa/Lambda FLC Ratio Latest Ref Range: 0.26 - 1.65  1.40     Lab Results   Component Value Date    IRON 79 01/12/2018    TIBC 260 01/12/2018    FERRITIN 409 (H) 01/12/2018     Results for JOHN MCGRATH  (MRN [id number]) as of 9/15/2017 10:06   Ref. Range 8/28/2017 12:00   Copper Latest Ref Range: 665 - 1480 ug/L 1239   Differential Method Unknown Automated   RBC Folate Latest Ref Range: 280 - 791 ng/mL 696   Zinc, Serum-ALT Latest Ref Range: 60 - 130 ug/dL 62     Lab Results   Component Value Date    WBC 4.45 01/12/2018    HGB 11.7 (L) 01/12/2018    HCT 34.4 (L) 01/12/2018    MCV 99 (H) 01/12/2018     01/12/2018       Assessment:       1. Iron deficiency anemia, unspecified iron deficiency anemia type        Plan:   46-year-old with history type 2 diabetes mellitus with diabetic chronic kidney disease,Chronic kidney disease stage III   Pt has a mild anemia dating back to at least 2016 with a hemoglobin 11.1g/dl .  Hb 11.7g/dL  S/p IV Fe therapy with Injectafer completed 9/2017   Fe parameters improved  Followed by GI -Dr. Rodriguez  S/p GI eval- no bleeding source  Cont to monitor     Follow-up in 2mos with cbc,Fe studies prior to f/u     Cc Emil Montejo M.D.        Alpesh Rodriguez II, MD

## 2018-04-09 ENCOUNTER — LAB VISIT (OUTPATIENT)
Dept: LAB | Facility: HOSPITAL | Age: 48
End: 2018-04-09
Attending: INTERNAL MEDICINE
Payer: MEDICARE

## 2018-04-09 DIAGNOSIS — D50.9 IRON DEFICIENCY ANEMIA, UNSPECIFIED IRON DEFICIENCY ANEMIA TYPE: ICD-10-CM

## 2018-04-09 LAB
BASOPHILS # BLD AUTO: 0.04 K/UL
BASOPHILS NFR BLD: 0.9 %
DIFFERENTIAL METHOD: ABNORMAL
EOSINOPHIL # BLD AUTO: 0.3 K/UL
EOSINOPHIL NFR BLD: 6 %
ERYTHROCYTE [DISTWIDTH] IN BLOOD BY AUTOMATED COUNT: 12.1 %
FERRITIN SERPL-MCNC: 361 NG/ML
HCT VFR BLD AUTO: 33.9 %
HGB BLD-MCNC: 11.5 G/DL
IRON SERPL-MCNC: 83 UG/DL
LYMPHOCYTES # BLD AUTO: 1.3 K/UL
LYMPHOCYTES NFR BLD: 27.4 %
MCH RBC QN AUTO: 33.7 PG
MCHC RBC AUTO-ENTMCNC: 33.9 G/DL
MCV RBC AUTO: 99 FL
MONOCYTES # BLD AUTO: 0.5 K/UL
MONOCYTES NFR BLD: 11.1 %
NEUTROPHILS # BLD AUTO: 2.6 K/UL
NEUTROPHILS NFR BLD: 54.6 %
PLATELET # BLD AUTO: 150 K/UL
PMV BLD AUTO: 11.5 FL
RBC # BLD AUTO: 3.41 M/UL
SATURATED IRON: 29 %
TOTAL IRON BINDING CAPACITY: 287 UG/DL
TRANSFERRIN SERPL-MCNC: 194 MG/DL
WBC # BLD AUTO: 4.7 K/UL

## 2018-04-09 PROCEDURE — 85025 COMPLETE CBC W/AUTO DIFF WBC: CPT

## 2018-04-09 PROCEDURE — 83540 ASSAY OF IRON: CPT

## 2018-04-09 PROCEDURE — 36415 COLL VENOUS BLD VENIPUNCTURE: CPT

## 2018-04-09 PROCEDURE — 82728 ASSAY OF FERRITIN: CPT

## 2018-04-11 ENCOUNTER — OFFICE VISIT (OUTPATIENT)
Dept: HEMATOLOGY/ONCOLOGY | Facility: CLINIC | Age: 48
End: 2018-04-11
Payer: MEDICARE

## 2018-04-11 VITALS
WEIGHT: 89.06 LBS | OXYGEN SATURATION: 97 % | DIASTOLIC BLOOD PRESSURE: 66 MMHG | TEMPERATURE: 98 F | SYSTOLIC BLOOD PRESSURE: 116 MMHG | HEART RATE: 84 BPM

## 2018-04-11 DIAGNOSIS — D50.9 IRON DEFICIENCY ANEMIA, UNSPECIFIED IRON DEFICIENCY ANEMIA TYPE: Primary | ICD-10-CM

## 2018-04-11 PROCEDURE — 99213 OFFICE O/P EST LOW 20 MIN: CPT | Mod: PBBFAC | Performed by: INTERNAL MEDICINE

## 2018-04-11 PROCEDURE — 99213 OFFICE O/P EST LOW 20 MIN: CPT | Mod: S$PBB,,, | Performed by: INTERNAL MEDICINE

## 2018-04-11 PROCEDURE — 99999 PR PBB SHADOW E&M-EST. PATIENT-LVL III: CPT | Mod: PBBFAC,,, | Performed by: INTERNAL MEDICINE

## 2018-04-11 NOTE — PROGRESS NOTES
Subjective:       Patient ID: Jarrett Odonnell Jr. is a 47 y.o. male.    Chief Complaint: No chief complaint on file.    HPI   Diagnosis: KELI      47-year-old gentleman with past medical history of type 2 diabetes mellitus, CK D3, hypertension seen today for f/u for KELI  He is accompanied by his mother.  She reports he was recently made aware of anemia.  No prior history of anemia according to patient.  No history of blood transfusions.  He reports fatigue for the past 2 months.  No lightheadedness, dizziness, shortness of breath or chest pain.  He reports he underwent a screening colonoscopy in 2010 which was  remarkable for benign polyps.  No melena, hematochezia change in bowel habits.  Appetite and weight stable.  No fevers or night sweats.  CBC from 8/24/2017 reveals a white blood cell count of 4100/MM 3 hemoglobin of 8.9 g/dL hematocrit of 28.6% MCV of 82.4 and a platelet count of 190 3K .     S/p Injectafer completed 9/2017    Accompanied by mother    Today, he is doing well  No new issues  No fatigue  No SOB/CP  No lightheadedness  Does not crave ice  No melena, hematochezia or change in bowel habits       Pt s/p GI eval 10/2017  EGD- nl esophagus, gastric polyp  Colonoscopy -[facility], o/w unremarkable  PAST MEDICAL HISTORY type 2 diabetes mellitus with diabetic chronic kidney disease, hypertensive chronic kidney disease, chronic kidney disease stage III      PAST SURGICAL HISTORY : Orchiectomy     SOCIAL HISTORY : No  history of tobacco use.  No history of ETOH use.  He lives with his parents.  He attends [employer].  No smoke or alcohol use      Review of Systems   Constitutional: Positive for fatigue. Negative for appetite change, fever and unexpected weight change.   HENT: Negative for mouth sores.    Eyes: Negative for visual disturbance.   Respiratory: Negative for cough and shortness of breath.    Cardiovascular: Negative for chest pain.   Gastrointestinal: Negative for abdominal pain and  diarrhea.   Genitourinary: Negative for frequency.   Musculoskeletal: Negative for back pain.   Skin: Negative for rash.   Neurological: Negative for headaches.   Hematological: Negative for adenopathy.   Psychiatric/Behavioral: The patient is not nervous/anxious.        Objective:       Vitals:    04/11/18 1534   BP: 116/66   BP Location: Left arm   Patient Position: Sitting   BP Method: Small (Automatic)   Pulse: 84   Temp: 98.2 °F (36.8 °C)   TempSrc: Oral   SpO2: 97%   Weight: 40.4 kg (89 lb 1.1 oz)         Physical Exam   Constitutional: He is oriented to person, place, and time. He appears well-developed and well-nourished.   HENT:   Head: Normocephalic.   Mouth/Throat: Oropharynx is clear and moist. No oropharyngeal exudate.   Eyes: Conjunctivae are normal. No scleral icterus.   Neck: Normal range of motion. Neck supple. No thyromegaly present.   Cardiovascular: Normal rate, regular rhythm and normal heart sounds.    No murmur heard.  Pulmonary/Chest: Effort normal and breath sounds normal. He has no wheezes. He has no rales.   Abdominal: Soft. Bowel sounds are normal. He exhibits no distension and no mass. There is no hepatosplenomegaly. There is no tenderness. There is no rebound and no guarding.   Musculoskeletal: Normal range of motion. He exhibits no edema.   Lymphadenopathy:     He has no cervical adenopathy.     He has no axillary adenopathy.        Right: No supraclavicular adenopathy present.        Left: No supraclavicular adenopathy present.   Neurological: He is alert and oriented to person, place, and time. No cranial nerve deficit.   Skin: No rash noted. No erythema.   Psychiatric: He has a normal mood and affect.       Outside Facility labs Reviewed.  8/24/2017 BUN 17 Creatinine 1.27 Sodium 140 Potassium 3.7 Chloride 106 Calcium 8.7 Phosphorous 2.3 Hemoglobin 8.9 Hematocrit 28.6% plt ct t 190K from   3/7/20/17 Hemoglobin 10.3 g/dL  Hematocrit 31.7%    SPEP-nl    Results for JOHN MCGRATH JR.  (MRN [id number]) as of 9/15/2017 10:06   Ref. Range 8/28/2017 12:00   [place] Free Light Chains Latest Ref Range: 0.33 - 1.94 mg/dL 4.70 (H)   Lambda Free Light Chains Latest Ref Range: 0.57 - 2.63 mg/dL 3.35 (H)   Kappa/Lambda FLC Ratio Latest Ref Range: 0.26 - 1.65  1.40     Lab Results   Component Value Date    IRON 83 04/09/2018    TIBC 287 04/09/2018    FERRITIN 361 (H) 04/09/2018     Results for JOHN MCGRATH JR. (MRN [id number]) as of 9/15/2017 10:06   Ref. Range 8/28/2017 12:00   Copper Latest Ref Range: 665 - 1480 ug/L 1239   Differential Method Unknown Automated   RBC Folate Latest Ref Range: 280 - 791 ng/mL 696   Zinc, Serum-ALT Latest Ref Range: 60 - 130 ug/dL 62     Lab Results   Component Value Date    WBC 4.70 04/09/2018    HGB 11.5 (L) 04/09/2018    HCT 33.9 (L) 04/09/2018    MCV 99 (H) 04/09/2018     04/09/2018       Assessment:       1. Iron deficiency anemia, unspecified iron deficiency anemia type        Plan:   46-year-old with history type 2 diabetes mellitus with diabetic chronic kidney disease,Chronic kidney disease stage III   Pt has a mild anemia dating back to at least 2016 with a hemoglobin 11.1g/dl .  Hb 11.5g/dL  S/p IV Fe therapy with Injectafer completed 9/2017   Fe parameters OK   Followed by GI -Dr. Rodriguez  S/p GI eval- no bleeding source  Cont to monitor     Follow-up in 3mos with cbc,Fe studies prior to f/u     Cc Emil Montejo M.D.        Alpesh Rodriguez II, MD

## 2018-07-09 ENCOUNTER — LAB VISIT (OUTPATIENT)
Dept: LAB | Facility: HOSPITAL | Age: 48
End: 2018-07-09
Attending: INTERNAL MEDICINE
Payer: MEDICARE

## 2018-07-09 DIAGNOSIS — D50.9 IRON DEFICIENCY ANEMIA, UNSPECIFIED IRON DEFICIENCY ANEMIA TYPE: ICD-10-CM

## 2018-07-09 LAB
BASOPHILS # BLD AUTO: 0.03 K/UL
BASOPHILS NFR BLD: 0.8 %
DIFFERENTIAL METHOD: ABNORMAL
EOSINOPHIL # BLD AUTO: 0.2 K/UL
EOSINOPHIL NFR BLD: 5.5 %
ERYTHROCYTE [DISTWIDTH] IN BLOOD BY AUTOMATED COUNT: 12.3 %
FERRITIN SERPL-MCNC: 304 NG/ML
HCT VFR BLD AUTO: 32 %
HGB BLD-MCNC: 11 G/DL
IRON SERPL-MCNC: 75 UG/DL
LYMPHOCYTES # BLD AUTO: 1 K/UL
LYMPHOCYTES NFR BLD: 28.7 %
MCH RBC QN AUTO: 34.7 PG
MCHC RBC AUTO-ENTMCNC: 34.4 G/DL
MCV RBC AUTO: 101 FL
MONOCYTES # BLD AUTO: 0.4 K/UL
MONOCYTES NFR BLD: 12.1 %
NEUTROPHILS # BLD AUTO: 1.9 K/UL
NEUTROPHILS NFR BLD: 52.9 %
PLATELET # BLD AUTO: 138 K/UL
PMV BLD AUTO: 11.9 FL
RBC # BLD AUTO: 3.17 M/UL
SATURATED IRON: 27 %
TOTAL IRON BINDING CAPACITY: 274 UG/DL
TRANSFERRIN SERPL-MCNC: 185 MG/DL
WBC # BLD AUTO: 3.63 K/UL

## 2018-07-09 PROCEDURE — 83540 ASSAY OF IRON: CPT

## 2018-07-09 PROCEDURE — 85025 COMPLETE CBC W/AUTO DIFF WBC: CPT

## 2018-07-09 PROCEDURE — 82728 ASSAY OF FERRITIN: CPT

## 2018-07-09 PROCEDURE — 36415 COLL VENOUS BLD VENIPUNCTURE: CPT

## 2018-07-11 ENCOUNTER — OFFICE VISIT (OUTPATIENT)
Dept: HEMATOLOGY/ONCOLOGY | Facility: CLINIC | Age: 48
End: 2018-07-11
Payer: MEDICARE

## 2018-07-11 VITALS
HEIGHT: 63 IN | SYSTOLIC BLOOD PRESSURE: 102 MMHG | WEIGHT: 88.88 LBS | HEART RATE: 67 BPM | OXYGEN SATURATION: 97 % | BODY MASS INDEX: 15.75 KG/M2 | DIASTOLIC BLOOD PRESSURE: 86 MMHG | TEMPERATURE: 98 F

## 2018-07-11 DIAGNOSIS — D50.9 IRON DEFICIENCY ANEMIA, UNSPECIFIED IRON DEFICIENCY ANEMIA TYPE: Primary | ICD-10-CM

## 2018-07-11 PROCEDURE — 99213 OFFICE O/P EST LOW 20 MIN: CPT | Mod: PBBFAC | Performed by: INTERNAL MEDICINE

## 2018-07-11 PROCEDURE — 99213 OFFICE O/P EST LOW 20 MIN: CPT | Mod: S$PBB,,, | Performed by: INTERNAL MEDICINE

## 2018-07-11 PROCEDURE — 99999 PR PBB SHADOW E&M-EST. PATIENT-LVL III: CPT | Mod: PBBFAC,,, | Performed by: INTERNAL MEDICINE

## 2018-07-11 RX ORDER — DOXAZOSIN 4 MG/1
TABLET ORAL
COMMUNITY
Start: 2018-07-05

## 2018-07-11 RX ORDER — TRIAMCINOLONE ACETONIDE 1 MG/G
OINTMENT TOPICAL
COMMUNITY
Start: 2018-06-20 | End: 2018-10-11

## 2018-07-11 RX ORDER — INSULIN GLARGINE 100 [IU]/ML
INJECTION, SOLUTION SUBCUTANEOUS NIGHTLY
COMMUNITY
End: 2019-04-16

## 2018-07-11 NOTE — PROGRESS NOTES
Subjective:       Patient ID: Jarrett Odonnell Jr. is a 47 y.o. male.    Chief Complaint: Follow-up    HPI   Diagnosis: KELI      47-year-old gentleman with past medical history of type 2 diabetes mellitus, CK D3, hypertension seen today for f/u for KELI  He is accompanied by his mother.  She reports he was recently made aware of anemia.  No prior history of anemia according to patient.  No history of blood transfusions.  He reports fatigue for the past 2 months.  No lightheadedness, dizziness, shortness of breath or chest pain.  He reports he underwent a screening colonoscopy in 2010 which was  remarkable for benign polyps.  No melena, hematochezia change in bowel habits.  Appetite and weight stable.  No fevers or night sweats.  CBC from 8/24/2017 reveals a white blood cell count of 4100/MM 3 hemoglobin of 8.9 g/dL hematocrit of 28.6% MCV of 82.4 and a platelet count of 190 3K .     S/p Injectafer completed 9/2017    Accompanied by mother    Today, he is doing well  No new issues  He is in good spirits   His diabetic meds and antihypertensive meds have been adjusted recently   No fatigue  No SOB/CP  No lightheadedness  No melena, hematochezia or change in bowel habits       Pt s/p GI eval 10/2017  EGD- nl esophagus, gastric polyp  Colonoscopy -[facility], o/w unremarkable  PAST MEDICAL HISTORY type 2 diabetes mellitus with diabetic chronic kidney disease, hypertensive chronic kidney disease, chronic kidney disease stage III      PAST SURGICAL HISTORY : Orchiectomy     SOCIAL HISTORY : No  history of tobacco use.  No history of ETOH use.  He lives with his parents.  He attends [employer].  No smoke or alcohol use      Review of Systems   Constitutional: Positive for fatigue. Negative for appetite change, fever and unexpected weight change.   HENT: Negative for mouth sores.    Eyes: Negative for visual disturbance.   Respiratory: Negative for cough and shortness of breath.    Cardiovascular: Negative for chest pain.  "  Gastrointestinal: Negative for abdominal pain and diarrhea.   Genitourinary: Negative for frequency.   Musculoskeletal: Negative for back pain.   Skin: Negative for rash.   Neurological: Negative for headaches.   Hematological: Negative for adenopathy.   Psychiatric/Behavioral: The patient is not nervous/anxious.        Objective:       Vitals:    07/11/18 1035   BP: 102/86   BP Location: Right arm   Patient Position: Sitting   BP Method: Medium (Automatic)   Pulse: 67   Temp: 97.5 °F (36.4 °C)   TempSrc: Oral   SpO2: 97%   Weight: 40.3 kg (88 lb 13.5 oz)   Height: 5' 3" (1.6 m)         Physical Exam   Constitutional: He is oriented to person, place, and time. He appears well-developed and well-nourished.   HENT:   Head: Normocephalic.   Mouth/Throat: Oropharynx is clear and moist. No oropharyngeal exudate.   Eyes: Conjunctivae are normal. No scleral icterus.   Neck: Normal range of motion. Neck supple. No thyromegaly present.   Cardiovascular: Normal rate, regular rhythm and normal heart sounds.    No murmur heard.  Pulmonary/Chest: Effort normal and breath sounds normal. He has no wheezes. He has no rales.   Abdominal: Soft. Bowel sounds are normal. He exhibits no distension and no mass. There is no hepatosplenomegaly. There is no tenderness. There is no rebound and no guarding.   Musculoskeletal: Normal range of motion. He exhibits no edema.   Lymphadenopathy:     He has no cervical adenopathy.     He has no axillary adenopathy.        Right: No supraclavicular adenopathy present.        Left: No supraclavicular adenopathy present.   Neurological: He is alert and oriented to person, place, and time. No cranial nerve deficit.   Skin: No rash noted. No erythema.   Psychiatric: He has a normal mood and affect.       Outside Facility labs Reviewed.  8/24/2017 BUN 17 Creatinine 1.27 Sodium 140 Potassium 3.7 Chloride 106 Calcium 8.7 Phosphorous 2.3 Hemoglobin 8.9 Hematocrit 28.6% plt ct t 190K from   3/7/20/17 " Hemoglobin 10.3 g/dL  Hematocrit 31.7%    SPEP-nl    Results for JOHN MCGRATH JR. (MRN [id number]) as of 9/15/2017 10:06   Ref. Range 8/28/2017 12:00   [place] Free Light Chains Latest Ref Range: 0.33 - 1.94 mg/dL 4.70 (H)   Lambda Free Light Chains Latest Ref Range: 0.57 - 2.63 mg/dL 3.35 (H)   Kappa/Lambda FLC Ratio Latest Ref Range: 0.26 - 1.65  1.40     Lab Results   Component Value Date    IRON 75 07/09/2018    TIBC 274 07/09/2018    FERRITIN 304 (H) 07/09/2018     Results for JOHN MCGRATH JR. (MRN [id number]) as of 9/15/2017 10:06   Ref. Range 8/28/2017 12:00   Copper Latest Ref Range: 665 - 1480 ug/L 1239   Differential Method Unknown Automated   RBC Folate Latest Ref Range: 280 - 791 ng/mL 696   Zinc, Serum-ALT Latest Ref Range: 60 - 130 ug/dL 62     Lab Results   Component Value Date    WBC 3.63 (L) 07/09/2018    HGB 11.0 (L) 07/09/2018    HCT 32.0 (L) 07/09/2018     (H) 07/09/2018     (L) 07/09/2018       Assessment:       1. Iron deficiency anemia, unspecified iron deficiency anemia type        Plan:   46-year-old with history type 2 diabetes mellitus with diabetic chronic kidney disease,Chronic kidney disease stage III   Pt has a mild anemia dating back to at least 2016 with a hemoglobin 11.1g/dl .  Hb 11g/dL  S/p IV Fe therapy with Injectafer completed 9/2017   Fe parameters OK   S/p GI eval ( Dr. Rodriguez) - no bleeding source  Cont to monitor     Follow-up in 3mos with cbc,Fe studies prior to f/u     Cc Emil Montejo M.D.        Alpesh Rodriguez II, MD

## 2018-08-15 DIAGNOSIS — R10.9 STOMACH ACHE: Primary | ICD-10-CM

## 2018-08-15 DIAGNOSIS — R10.9 AP (ABDOMINAL PAIN): ICD-10-CM

## 2018-08-29 ENCOUNTER — HOSPITAL ENCOUNTER (OUTPATIENT)
Dept: RADIOLOGY | Facility: HOSPITAL | Age: 48
Discharge: HOME | End: 2018-08-29
Attending: SURGERY
Payer: MEDICARE

## 2018-08-29 DIAGNOSIS — R10.9 AP (ABDOMINAL PAIN): ICD-10-CM

## 2018-08-29 PROCEDURE — 78226 HEPATOBILIARY SYSTEM IMAGING: CPT | Mod: 26,,, | Performed by: RADIOLOGY

## 2018-08-29 PROCEDURE — A9537 TC99M MEBROFENIN: HCPCS

## 2018-09-18 ENCOUNTER — LAB VISIT (OUTPATIENT)
Dept: LAB | Facility: HOSPITAL | Age: 48
End: 2018-09-18
Attending: SURGERY
Payer: MEDICARE

## 2018-09-18 DIAGNOSIS — R10.9 STOMACH ACHE: Primary | ICD-10-CM

## 2018-09-18 LAB
ALBUMIN SERPL BCP-MCNC: 3.8 G/DL
ALP SERPL-CCNC: 69 U/L
ALT SERPL W/O P-5'-P-CCNC: 24 U/L
AST SERPL-CCNC: 21 U/L
BILIRUB DIRECT SERPL-MCNC: 0.1 MG/DL
BILIRUB SERPL-MCNC: 0.4 MG/DL
PROT SERPL-MCNC: 7.1 G/DL

## 2018-09-18 PROCEDURE — 36415 COLL VENOUS BLD VENIPUNCTURE: CPT

## 2018-09-18 PROCEDURE — 80076 HEPATIC FUNCTION PANEL: CPT

## 2018-09-26 ENCOUNTER — HOSPITAL ENCOUNTER (OUTPATIENT)
Dept: RADIOLOGY | Facility: HOSPITAL | Age: 48
Discharge: HOME | End: 2018-09-26
Attending: SURGERY
Payer: MEDICARE

## 2018-09-26 DIAGNOSIS — R10.9 AP (ABDOMINAL PAIN): ICD-10-CM

## 2018-09-26 PROCEDURE — 76705 ECHO EXAM OF ABDOMEN: CPT | Mod: 26,,, | Performed by: RADIOLOGY

## 2018-09-26 PROCEDURE — 76705 ECHO EXAM OF ABDOMEN: CPT | Mod: TC

## 2018-10-02 ENCOUNTER — LAB VISIT (OUTPATIENT)
Dept: LAB | Facility: HOSPITAL | Age: 48
End: 2018-10-02
Attending: INTERNAL MEDICINE
Payer: MEDICARE

## 2018-10-02 DIAGNOSIS — D50.9 IRON DEFICIENCY ANEMIA, UNSPECIFIED IRON DEFICIENCY ANEMIA TYPE: ICD-10-CM

## 2018-10-02 LAB
BASOPHILS # BLD AUTO: 0.02 K/UL
BASOPHILS NFR BLD: 0.4 %
DIFFERENTIAL METHOD: ABNORMAL
EOSINOPHIL # BLD AUTO: 0.3 K/UL
EOSINOPHIL NFR BLD: 5.7 %
ERYTHROCYTE [DISTWIDTH] IN BLOOD BY AUTOMATED COUNT: 11.7 %
FERRITIN SERPL-MCNC: 256 NG/ML
HCT VFR BLD AUTO: 36.8 %
HGB BLD-MCNC: 12.6 G/DL
IRON SERPL-MCNC: 59 UG/DL
LYMPHOCYTES # BLD AUTO: 0.9 K/UL
LYMPHOCYTES NFR BLD: 20.5 %
MCH RBC QN AUTO: 34.1 PG
MCHC RBC AUTO-ENTMCNC: 34.2 G/DL
MCV RBC AUTO: 100 FL
MONOCYTES # BLD AUTO: 0.7 K/UL
MONOCYTES NFR BLD: 14.2 %
NEUTROPHILS # BLD AUTO: 2.7 K/UL
NEUTROPHILS NFR BLD: 59.2 %
PLATELET # BLD AUTO: 154 K/UL
PMV BLD AUTO: 11.4 FL
RBC # BLD AUTO: 3.7 M/UL
SATURATED IRON: 22 %
TOTAL IRON BINDING CAPACITY: 274 UG/DL
TRANSFERRIN SERPL-MCNC: 185 MG/DL
WBC # BLD AUTO: 4.59 K/UL

## 2018-10-02 PROCEDURE — 83540 ASSAY OF IRON: CPT

## 2018-10-02 PROCEDURE — 36415 COLL VENOUS BLD VENIPUNCTURE: CPT

## 2018-10-02 PROCEDURE — 85025 COMPLETE CBC W/AUTO DIFF WBC: CPT

## 2018-10-02 PROCEDURE — 82728 ASSAY OF FERRITIN: CPT

## 2018-10-11 ENCOUNTER — OFFICE VISIT (OUTPATIENT)
Dept: HEMATOLOGY/ONCOLOGY | Facility: CLINIC | Age: 48
End: 2018-10-11
Payer: MEDICARE

## 2018-10-11 VITALS
OXYGEN SATURATION: 96 % | TEMPERATURE: 99 F | HEIGHT: 64 IN | HEART RATE: 76 BPM | SYSTOLIC BLOOD PRESSURE: 138 MMHG | DIASTOLIC BLOOD PRESSURE: 42 MMHG | BODY MASS INDEX: 15.72 KG/M2 | WEIGHT: 92.06 LBS

## 2018-10-11 DIAGNOSIS — D50.9 IRON DEFICIENCY ANEMIA, UNSPECIFIED IRON DEFICIENCY ANEMIA TYPE: Primary | ICD-10-CM

## 2018-10-11 PROCEDURE — 99999 PR PBB SHADOW E&M-EST. PATIENT-LVL IV: CPT | Mod: PBBFAC,,, | Performed by: INTERNAL MEDICINE

## 2018-10-11 PROCEDURE — 99214 OFFICE O/P EST MOD 30 MIN: CPT | Mod: PBBFAC | Performed by: INTERNAL MEDICINE

## 2018-10-11 PROCEDURE — 99213 OFFICE O/P EST LOW 20 MIN: CPT | Mod: S$PBB,,, | Performed by: INTERNAL MEDICINE

## 2018-10-11 RX ORDER — NATEGLINIDE 60 MG/1
120 TABLET ORAL
COMMUNITY
Start: 2018-09-14

## 2018-10-11 NOTE — PROGRESS NOTES
Subjective:       Patient ID: Jarrett Odonnell Jr. is a 48 y.o. male.    Chief Complaint: Follow-up    HPI   Diagnosis: KELI      89-year-old gentleman with past medical history of type 2 diabetes mellitus, CK D3, hypertension seen today for f/u for KELI  He is accompanied by his mother.  She reports he was recently made aware of anemia.  No prior history of anemia according to patient.  No history of blood transfusions.  He reports fatigue for the past 2 months.  No lightheadedness, dizziness, shortness of breath or chest pain.  He reports he underwent a screening colonoscopy in 2010 which was  remarkable for benign polyps.  No melena, hematochezia change in bowel habits.  Appetite and weight stable.  No fevers or night sweats.  CBC from 8/24/2017 reveals a white blood cell count of 4100/MM 3 hemoglobin of 8.9 g/dL hematocrit of 28.6% MCV of 82.4 and a platelet count of 190 3K .     S/p Injectafer completed 9/2017    Accompanied by mother    Today, he is doing well  No new issues  He is in good spirits   Appetite and weight stable  No fatigue  No SOB/CP  No melena, hematochezia or change in bowel habits       Pt s/p GI eval 10/2017  EGD- nl esophagus, gastric polyp  Colonoscopy -[facility], o/w unremarkable  PAST MEDICAL HISTORY type 2 diabetes mellitus with diabetic chronic kidney disease, hypertensive chronic kidney disease, chronic kidney disease stage III      PAST SURGICAL HISTORY : Orchiectomy     SOCIAL HISTORY : No  history of tobacco use.  No history of ETOH use.  He lives with his parents.  He attends [employer].  No smoke or alcohol use      Review of Systems   Constitutional: Negative for appetite change, fatigue, fever and unexpected weight change.   HENT: Negative for mouth sores.    Eyes: Negative for visual disturbance.   Respiratory: Negative for cough and shortness of breath.    Cardiovascular: Negative for chest pain.   Gastrointestinal: Negative for abdominal pain and diarrhea.  "  Genitourinary: Negative for frequency.   Musculoskeletal: Negative for back pain.   Skin: Negative for rash.   Neurological: Negative for headaches.   Hematological: Negative for adenopathy.   Psychiatric/Behavioral: The patient is not nervous/anxious.        Objective:       Vitals:    10/11/18 1432 10/11/18 1436   BP: (!) 142/82 (!) 138/42   BP Location: Right arm Right arm   Patient Position: Sitting Sitting   BP Method: Medium (Automatic) Medium (Manual)   Pulse: 76    Temp: 98.5 °F (36.9 °C)    TempSrc: Oral    SpO2: 96%    Weight: 41.7 kg (92 lb 0.7 oz)    Height: 5' 3.5" (1.613 m)          Physical Exam   Constitutional: He is oriented to person, place, and time. He appears well-developed and well-nourished.   HENT:   Head: Normocephalic.   Mouth/Throat: Oropharynx is clear and moist. No oropharyngeal exudate.   Eyes: Conjunctivae are normal. No scleral icterus.   Neck: Normal range of motion. Neck supple. No thyromegaly present.   Cardiovascular: Normal rate, regular rhythm and normal heart sounds.   No murmur heard.  Pulmonary/Chest: Effort normal and breath sounds normal. He has no wheezes. He has no rales.   Abdominal: Soft. Bowel sounds are normal. He exhibits no distension and no mass. There is no hepatosplenomegaly. There is no tenderness. There is no rebound and no guarding.   Musculoskeletal: Normal range of motion. He exhibits no edema.   Lymphadenopathy:     He has no cervical adenopathy.     He has no axillary adenopathy.        Right: No supraclavicular adenopathy present.        Left: No supraclavicular adenopathy present.   Neurological: He is alert and oriented to person, place, and time. No cranial nerve deficit.   Skin: No rash noted. No erythema.   Psychiatric: He has a normal mood and affect.       Outside Facility labs Reviewed.  8/24/2017 BUN 17 Creatinine 1.27 Sodium 140 Potassium 3.7 Chloride 106 Calcium 8.7 Phosphorous 2.3 Hemoglobin 8.9 Hematocrit 28.6% plt ct t 190K from   " 3/7/20/17 Hemoglobin 10.3 g/dL  Hematocrit 31.7%    SPEP-nl    Results for JOHN MCGRATH JR. (MRN [id number]) as of 9/15/2017 10:06   Ref. Range 8/28/2017 12:00   [place] Free Light Chains Latest Ref Range: 0.33 - 1.94 mg/dL 4.70 (H)   Lambda Free Light Chains Latest Ref Range: 0.57 - 2.63 mg/dL 3.35 (H)   Kappa/Lambda FLC Ratio Latest Ref Range: 0.26 - 1.65  1.40     Lab Results   Component Value Date    IRON 59 10/02/2018    TIBC 274 10/02/2018    FERRITIN 256 10/02/2018     Results for JOHN MCGRATH JR. (MRN [id number]) as of 9/15/2017 10:06   Ref. Range 8/28/2017 12:00   Copper Latest Ref Range: 665 - 1480 ug/L 1239   Differential Method Unknown Automated   RBC Folate Latest Ref Range: 280 - 791 ng/mL 696   Zinc, Serum-ALT Latest Ref Range: 60 - 130 ug/dL 62     Lab Results   Component Value Date    WBC 4.59 10/02/2018    HGB 12.6 (L) 10/02/2018    HCT 36.8 (L) 10/02/2018     (H) 10/02/2018     10/02/2018     Lab Results   Component Value Date    IRON 59 10/02/2018    TIBC 274 10/02/2018    FERRITIN 256 10/02/2018       Assessment:       1. Iron deficiency anemia, unspecified iron deficiency anemia type        Plan:   46-year-old with history type 2 diabetes mellitus with diabetic chronic kidney disease,Chronic kidney disease stage III   Pt has a mild anemia dating back to at least 2016 with a hemoglobin 11.1g/dl .  Hb 12.6 g/dL  S/p IV Fe therapy with Injectafer completed 9/2017   Fe parameters OK   S/p GI eval ( Dr. Rodriguez) - no bleeding source  Cont to monitor     Follow-up in 4mos with cbc,Fe studies prior to f/u     Cc Emil Montejo M.D.        Alpesh Rodriguez II, MD

## 2019-04-09 ENCOUNTER — LAB VISIT (OUTPATIENT)
Dept: LAB | Facility: HOSPITAL | Age: 49
End: 2019-04-09
Attending: INTERNAL MEDICINE
Payer: MEDICARE

## 2019-04-09 DIAGNOSIS — D50.9 IRON DEFICIENCY ANEMIA, UNSPECIFIED IRON DEFICIENCY ANEMIA TYPE: ICD-10-CM

## 2019-04-09 LAB
BASOPHILS # BLD AUTO: 0.04 K/UL (ref 0–0.2)
BASOPHILS NFR BLD: 1 % (ref 0–1.9)
DIFFERENTIAL METHOD: ABNORMAL
EOSINOPHIL # BLD AUTO: 0.3 K/UL (ref 0–0.5)
EOSINOPHIL NFR BLD: 7.2 % (ref 0–8)
ERYTHROCYTE [DISTWIDTH] IN BLOOD BY AUTOMATED COUNT: 12.2 % (ref 11.5–14.5)
FERRITIN SERPL-MCNC: 203 NG/ML (ref 20–300)
HCT VFR BLD AUTO: 39.1 % (ref 40–54)
HGB BLD-MCNC: 12.8 G/DL (ref 14–18)
IRON SERPL-MCNC: 74 UG/DL (ref 45–160)
LYMPHOCYTES # BLD AUTO: 1.1 K/UL (ref 1–4.8)
LYMPHOCYTES NFR BLD: 27.4 % (ref 18–48)
MCH RBC QN AUTO: 32.8 PG (ref 27–31)
MCHC RBC AUTO-ENTMCNC: 32.7 G/DL (ref 32–36)
MCV RBC AUTO: 100 FL (ref 82–98)
MONOCYTES # BLD AUTO: 0.4 K/UL (ref 0.3–1)
MONOCYTES NFR BLD: 11.4 % (ref 4–15)
NEUTROPHILS # BLD AUTO: 2.1 K/UL (ref 1.8–7.7)
NEUTROPHILS NFR BLD: 53 % (ref 38–73)
PLATELET # BLD AUTO: 157 K/UL (ref 150–350)
PMV BLD AUTO: 11.7 FL (ref 9.2–12.9)
RBC # BLD AUTO: 3.9 M/UL (ref 4.6–6.2)
SATURATED IRON: 27 % (ref 20–50)
TOTAL IRON BINDING CAPACITY: 277 UG/DL (ref 250–450)
TRANSFERRIN SERPL-MCNC: 187 MG/DL (ref 200–375)
WBC # BLD AUTO: 3.87 K/UL (ref 3.9–12.7)

## 2019-04-09 PROCEDURE — 36415 COLL VENOUS BLD VENIPUNCTURE: CPT

## 2019-04-09 PROCEDURE — 85025 COMPLETE CBC W/AUTO DIFF WBC: CPT

## 2019-04-09 PROCEDURE — 82728 ASSAY OF FERRITIN: CPT

## 2019-04-09 PROCEDURE — 83540 ASSAY OF IRON: CPT

## 2019-04-16 ENCOUNTER — OFFICE VISIT (OUTPATIENT)
Dept: HEMATOLOGY/ONCOLOGY | Facility: CLINIC | Age: 49
End: 2019-04-16
Payer: MEDICARE

## 2019-04-16 VITALS
WEIGHT: 95 LBS | HEIGHT: 63 IN | SYSTOLIC BLOOD PRESSURE: 133 MMHG | OXYGEN SATURATION: 98 % | HEART RATE: 69 BPM | BODY MASS INDEX: 16.83 KG/M2 | DIASTOLIC BLOOD PRESSURE: 73 MMHG | TEMPERATURE: 98 F

## 2019-04-16 DIAGNOSIS — D50.9 IRON DEFICIENCY ANEMIA, UNSPECIFIED IRON DEFICIENCY ANEMIA TYPE: Primary | ICD-10-CM

## 2019-04-16 DIAGNOSIS — D75.89 MACROCYTOSIS: ICD-10-CM

## 2019-04-16 PROCEDURE — 99213 PR OFFICE/OUTPT VISIT, EST, LEVL III, 20-29 MIN: ICD-10-PCS | Mod: S$PBB,,, | Performed by: INTERNAL MEDICINE

## 2019-04-16 PROCEDURE — 99213 OFFICE O/P EST LOW 20 MIN: CPT | Mod: S$PBB,,, | Performed by: INTERNAL MEDICINE

## 2019-04-16 PROCEDURE — 99999 PR PBB SHADOW E&M-EST. PATIENT-LVL IV: ICD-10-PCS | Mod: PBBFAC,,, | Performed by: INTERNAL MEDICINE

## 2019-04-16 PROCEDURE — 99999 PR PBB SHADOW E&M-EST. PATIENT-LVL IV: CPT | Mod: PBBFAC,,, | Performed by: INTERNAL MEDICINE

## 2019-04-16 PROCEDURE — 99214 OFFICE O/P EST MOD 30 MIN: CPT | Mod: PBBFAC | Performed by: INTERNAL MEDICINE

## 2019-04-16 RX ORDER — PEN NEEDLE, DIABETIC 30 GX3/16"
NEEDLE, DISPOSABLE MISCELLANEOUS
COMMUNITY

## 2019-04-16 RX ORDER — NAPHAZOLINE HYDROCHLORIDE AND POLYETHYLENE GLYCOL 300 .1; 2 MG/ML; MG/ML
8 SOLUTION/ DROPS OPHTHALMIC DAILY
COMMUNITY

## 2019-04-16 RX ORDER — CLOTRIMAZOLE AND BETAMETHASONE DIPROPIONATE 10; .64 MG/G; MG/G
CREAM TOPICAL
COMMUNITY
End: 2020-01-02 | Stop reason: ALTCHOICE

## 2019-04-16 RX ORDER — SITAGLIPTIN 100 MG/1
50 TABLET, FILM COATED ORAL DAILY
COMMUNITY
Start: 2019-03-26

## 2019-04-16 RX ORDER — INSULIN ASPART 100 [IU]/ML
INJECTION, SOLUTION INTRAVENOUS; SUBCUTANEOUS
COMMUNITY

## 2019-04-16 NOTE — PROGRESS NOTES
Subjective:       Patient ID: Jarrett Odonnell Jr. is a 48 y.o. male.    Chief Complaint: Follow-up    HPI   Diagnosis: KELI      48-year-old gentleman with past medical history of type 2 diabetes mellitus, CK D3, hypertension seen today for f/u for KELI  He is accompanied by his mother.    No history of blood transfusions.  No lightheadedness, dizziness, shortness of breath or chest pain.   No melena, hematochezia change in bowel habits.  Appetite and weight stable.  No fevers or night sweats.  CBC from 8/24/2017 revealed a white blood cell count of 4100/MM 3 hemoglobin of 8.9 g/dL hematocrit of 28.6% MCV of 82.4 and a platelet count of 199K .     S/p Injectafer completed 9/2017    Accompanied by mother    Today, he is doing well  No new issues  He is in good spirits   Appetite and weight stable  No fatigue  No SOB/CP.lightheadedness  No melena, hematochezia or change in bowel habits     CBC  reveals  white blood cell count of 3870/MM 3 hemoglobin of  12.8  g/dL hematocrit of 39.1 % MCV of 82.4 and a platelet count of 199K .         Pt s/p GI eval 10/2017  EGD- nl esophagus, gastric polyp  Colonoscopy -[facility], o/w unremarkable        PAST MEDICAL HISTORY type 2 diabetes mellitus with diabetic chronic kidney disease, hypertensive chronic kidney disease, chronic kidney disease stage III      PAST SURGICAL HISTORY : Orchiectomy     SOCIAL HISTORY : No  history of tobacco use.  No history of ETOH use.  He lives with his parents.  He attends [employer].  No smoke or alcohol use      Review of Systems   Constitutional: Negative for appetite change, fatigue, fever and unexpected weight change.   HENT: Negative for mouth sores.    Eyes: Negative for visual disturbance.   Respiratory: Negative for cough and shortness of breath.    Cardiovascular: Negative for chest pain.   Gastrointestinal: Negative for abdominal pain and diarrhea.   Genitourinary: Negative for frequency.   Musculoskeletal: Negative for back pain.  "  Skin: Negative for rash.   Neurological: Negative for headaches.   Hematological: Negative for adenopathy.   Psychiatric/Behavioral: The patient is not nervous/anxious.        Objective:       Vitals:    04/16/19 1454   BP: 133/73   BP Location: Right arm   Patient Position: Sitting   BP Method: Medium (Automatic)   Pulse: 69   Temp: 97.5 °F (36.4 °C)   TempSrc: Oral   SpO2: 98%   Weight: 43.1 kg (95 lb 0.3 oz)   Height: 5' 3" (1.6 m)         Physical Exam   Constitutional: He is oriented to person, place, and time. He appears well-developed and well-nourished.   HENT:   Head: Normocephalic.   Mouth/Throat: Oropharynx is clear and moist. No oropharyngeal exudate.   Eyes: Conjunctivae are normal. No scleral icterus.   Neck: Normal range of motion. Neck supple. No thyromegaly present.   Cardiovascular: Normal rate, regular rhythm and normal heart sounds.   No murmur heard.  Pulmonary/Chest: Effort normal and breath sounds normal. He has no wheezes. He has no rales.   Abdominal: Soft. Bowel sounds are normal. He exhibits no distension and no mass. There is no hepatosplenomegaly. There is no tenderness. There is no rebound and no guarding.   Musculoskeletal: Normal range of motion. He exhibits no edema.   Lymphadenopathy:     He has no cervical adenopathy.     He has no axillary adenopathy.        Right: No supraclavicular adenopathy present.        Left: No supraclavicular adenopathy present.   Neurological: He is alert and oriented to person, place, and time. No cranial nerve deficit.   Skin: No rash noted. No erythema.   Psychiatric: He has a normal mood and affect.       Outside Facility labs Reviewed.  8/24/2017 BUN 17 Creatinine 1.27 Sodium 140 Potassium 3.7 Chloride 106 Calcium 8.7 Phosphorous 2.3 Hemoglobin 8.9 Hematocrit 28.6% plt ct t 190K from   3/7/20/17 Hemoglobin 10.3 g/dL  Hematocrit 31.7%    SPEP-nl    Results for JOHN MCGRATH JR. (MRN [id number]) as of 9/15/2017 10:06   Ref. Range 8/28/2017 12:00 "   [place] Free Light Chains Latest Ref Range: 0.33 - 1.94 mg/dL 4.70 (H)   Lambda Free Light Chains Latest Ref Range: 0.57 - 2.63 mg/dL 3.35 (H)   Kappa/Lambda FLC Ratio Latest Ref Range: 0.26 - 1.65  1.40     Lab Results   Component Value Date    IRON 74 04/09/2019    TIBC 277 04/09/2019    FERRITIN 203 04/09/2019     Results for JOHN MCGRATH JR. (MRN [id number]) as of 9/15/2017 10:06   Ref. Range 8/28/2017 12:00   Copper Latest Ref Range: 665 - 1480 ug/L 1239   Differential Method Unknown Automated   RBC Folate Latest Ref Range: 280 - 791 ng/mL 696   Zinc, Serum-ALT Latest Ref Range: 60 - 130 ug/dL 62     Lab Results   Component Value Date    WBC 3.87 (L) 04/09/2019    HGB 12.8 (L) 04/09/2019    HCT 39.1 (L) 04/09/2019     (H) 04/09/2019     04/09/2019     Lab Results   Component Value Date    IRON 74 04/09/2019    TIBC 277 04/09/2019    FERRITIN 203 04/09/2019       Assessment:       1. Iron deficiency anemia, unspecified iron deficiency anemia type    2. Macrocytosis        Plan:   47-year-old with history type 2 diabetes mellitus with diabetic chronic kidney disease,Chronic kidney disease stage III   Pt has a mild anemia dating back to at least 2016   Hb 12.8 g/dL  S/p IV Fe therapy with Injectafer completed 9/2017   Fe parameters wnl  S/p GI eval ( Dr. Rodriguez) - no bleeding source    Plan testing for macrocytosis  Cont to monitor     Follow-up in 4mos with cbc,Fe studies prior to f/u     Cc Emil Montejo M.D.        Alpesh Rodriguez II, MD

## 2019-09-09 ENCOUNTER — LAB VISIT (OUTPATIENT)
Dept: LAB | Facility: HOSPITAL | Age: 49
End: 2019-09-09
Attending: INTERNAL MEDICINE
Payer: MEDICARE

## 2019-09-09 DIAGNOSIS — D75.89 MACROCYTOSIS: ICD-10-CM

## 2019-09-09 DIAGNOSIS — D50.9 IRON DEFICIENCY ANEMIA, UNSPECIFIED IRON DEFICIENCY ANEMIA TYPE: ICD-10-CM

## 2019-09-09 LAB
BASOPHILS # BLD AUTO: 0.05 K/UL (ref 0–0.2)
BASOPHILS NFR BLD: 1.1 % (ref 0–1.9)
DIFFERENTIAL METHOD: ABNORMAL
EOSINOPHIL # BLD AUTO: 0.3 K/UL (ref 0–0.5)
EOSINOPHIL NFR BLD: 7.7 % (ref 0–8)
ERYTHROCYTE [DISTWIDTH] IN BLOOD BY AUTOMATED COUNT: 12.2 % (ref 11.5–14.5)
FERRITIN SERPL-MCNC: 171 NG/ML (ref 20–300)
HCT VFR BLD AUTO: 40.4 % (ref 40–54)
HGB BLD-MCNC: 13.5 G/DL (ref 14–18)
IRON SERPL-MCNC: 62 UG/DL (ref 45–160)
LYMPHOCYTES # BLD AUTO: 0.9 K/UL (ref 1–4.8)
LYMPHOCYTES NFR BLD: 19.5 % (ref 18–48)
MCH RBC QN AUTO: 33.5 PG (ref 27–31)
MCHC RBC AUTO-ENTMCNC: 33.4 G/DL (ref 32–36)
MCV RBC AUTO: 100 FL (ref 82–98)
MONOCYTES # BLD AUTO: 0.5 K/UL (ref 0.3–1)
MONOCYTES NFR BLD: 11.8 % (ref 4–15)
NEUTROPHILS # BLD AUTO: 2.6 K/UL (ref 1.8–7.7)
NEUTROPHILS NFR BLD: 59.9 % (ref 38–73)
PLATELET # BLD AUTO: 172 K/UL (ref 150–350)
PMV BLD AUTO: 11.9 FL (ref 9.2–12.9)
RBC # BLD AUTO: 4.03 M/UL (ref 4.6–6.2)
RETICS/RBC NFR AUTO: 1 % (ref 0.4–2)
SATURATED IRON: 22 % (ref 20–50)
TOTAL IRON BINDING CAPACITY: 284 UG/DL (ref 250–450)
TRANSFERRIN SERPL-MCNC: 192 MG/DL (ref 200–375)
WBC # BLD AUTO: 4.41 K/UL (ref 3.9–12.7)

## 2019-09-09 PROCEDURE — 82747 ASSAY OF FOLIC ACID RBC: CPT

## 2019-09-09 PROCEDURE — 36415 COLL VENOUS BLD VENIPUNCTURE: CPT

## 2019-09-09 PROCEDURE — 83921 ORGANIC ACID SINGLE QUANT: CPT

## 2019-09-09 PROCEDURE — 84165 PROTEIN E-PHORESIS SERUM: CPT

## 2019-09-09 PROCEDURE — 85025 COMPLETE CBC W/AUTO DIFF WBC: CPT

## 2019-09-09 PROCEDURE — 83540 ASSAY OF IRON: CPT

## 2019-09-09 PROCEDURE — 82728 ASSAY OF FERRITIN: CPT

## 2019-09-09 PROCEDURE — 85045 AUTOMATED RETICULOCYTE COUNT: CPT

## 2019-09-09 PROCEDURE — 84165 PATHOLOGIST INTERPRETATION SPE: ICD-10-PCS | Mod: 26,,, | Performed by: PATHOLOGY

## 2019-09-09 PROCEDURE — 84630 ASSAY OF ZINC: CPT

## 2019-09-09 PROCEDURE — 84165 PROTEIN E-PHORESIS SERUM: CPT | Mod: 26,,, | Performed by: PATHOLOGY

## 2019-09-11 LAB
ALBUMIN SERPL ELPH-MCNC: 3.69 G/DL (ref 3.35–5.55)
ALPHA1 GLOB SERPL ELPH-MCNC: 0.28 G/DL (ref 0.17–0.41)
ALPHA2 GLOB SERPL ELPH-MCNC: 0.65 G/DL (ref 0.43–0.99)
B-GLOBULIN SERPL ELPH-MCNC: 0.67 G/DL (ref 0.5–1.1)
FOLATE RBC-MCNC: 697 NG/ML (ref 280–791)
GAMMA GLOB SERPL ELPH-MCNC: 1.11 G/DL (ref 0.67–1.58)
PATHOLOGIST INTERPRETATION SPE: NORMAL
PROT SERPL-MCNC: 6.4 G/DL (ref 6–8.4)
ZINC SERPL-MCNC: 46 UG/DL (ref 60–130)

## 2019-09-12 LAB — METHYLMALONATE SERPL-SCNC: 0.7 UMOL/L

## 2019-09-26 ENCOUNTER — OFFICE VISIT (OUTPATIENT)
Dept: HEMATOLOGY/ONCOLOGY | Facility: CLINIC | Age: 49
End: 2019-09-26
Payer: MEDICARE

## 2019-09-26 VITALS
WEIGHT: 98.13 LBS | HEIGHT: 63 IN | DIASTOLIC BLOOD PRESSURE: 86 MMHG | HEART RATE: 73 BPM | BODY MASS INDEX: 17.39 KG/M2 | SYSTOLIC BLOOD PRESSURE: 146 MMHG

## 2019-09-26 DIAGNOSIS — D75.89 MACROCYTOSIS: ICD-10-CM

## 2019-09-26 DIAGNOSIS — E11.9 DIABETES MELLITUS TYPE 2 IN NONOBESE: ICD-10-CM

## 2019-09-26 DIAGNOSIS — D35.01 ADENOMA OF RIGHT ADRENAL GLAND: ICD-10-CM

## 2019-09-26 DIAGNOSIS — E60 ZINC DEFICIENCY: ICD-10-CM

## 2019-09-26 DIAGNOSIS — D64.9 ANEMIA, UNSPECIFIED TYPE: Primary | ICD-10-CM

## 2019-09-26 PROCEDURE — 99999 PR PBB SHADOW E&M-EST. PATIENT-LVL III: ICD-10-PCS | Mod: PBBFAC,,, | Performed by: INTERNAL MEDICINE

## 2019-09-26 PROCEDURE — 99213 OFFICE O/P EST LOW 20 MIN: CPT | Mod: PBBFAC | Performed by: INTERNAL MEDICINE

## 2019-09-26 PROCEDURE — 99999 PR PBB SHADOW E&M-EST. PATIENT-LVL III: CPT | Mod: PBBFAC,,, | Performed by: INTERNAL MEDICINE

## 2019-09-26 PROCEDURE — 99214 OFFICE O/P EST MOD 30 MIN: CPT | Mod: S$PBB,,, | Performed by: INTERNAL MEDICINE

## 2019-09-26 PROCEDURE — 99214 PR OFFICE/OUTPT VISIT, EST, LEVL IV, 30-39 MIN: ICD-10-PCS | Mod: S$PBB,,, | Performed by: INTERNAL MEDICINE

## 2019-09-26 RX ORDER — INSULIN DEGLUDEC 100 U/ML
8 INJECTION, SOLUTION SUBCUTANEOUS
COMMUNITY
End: 2021-03-24

## 2019-09-26 NOTE — PROGRESS NOTES
Subjective:       Patient ID: Jarrett Odonnell Jr. is a 49 y.o. male.    Chief Complaint: Anemia    HPI   Diagnosis: KELI      49-year-old gentleman with past medical history of type 2 diabetes mellitus, CK D3, hypertension seen today for f/u for KELI  He is accompanied by his mother.    No history of blood transfusions.   Appetite and weight stable.  No fevers or night sweats.  CBC from 8/24/2017 revealed a white blood cell count of 4100/MM 3 hemoglobin of 8.9 g/dL hematocrit of 28.6% MCV of 82.4 and a platelet count of 199K .     S/p Injectafer completed 9/2017    Accompanied by mother      He has loose stools past few mos  He is followed by GI   He has been prescribed Loperamide and Dicyclomine   Appetite and weight stable  No fatigue  No SOB/CP.lightheadedness  He is followed at outside facility for adrenal lesion  Recent imaging outside facility reveals probable adrenal adenoma     CBC  reveals  white blood cell count of 4410/MM 3 hemoglobin of  13.5 g/dL hematocrit of 40.4  % MCV of 100 and a platelet count of 172K .         Pt s/p GI eval 10/2017  EGD- nl esophagus, gastric polyp  Colonoscopy -[facility], o/w unremarkable        PAST MEDICAL HISTORY type 2 diabetes mellitus with diabetic chronic kidney disease, hypertensive chronic kidney disease, chronic kidney disease stage III      PAST SURGICAL HISTORY : Orchiectomy     SOCIAL HISTORY : No  history of tobacco use.  No history of ETOH use.  He lives with his parents.  He attends [employer].  No smoke or alcohol use      Review of Systems   Constitutional: Negative for appetite change, fatigue, fever and unexpected weight change.   HENT: Negative for mouth sores.    Eyes: Negative for visual disturbance.   Respiratory: Negative for cough and shortness of breath.    Cardiovascular: Negative for chest pain.   Gastrointestinal: Negative for abdominal pain and diarrhea.   Genitourinary: Negative for frequency.   Musculoskeletal: Negative for back pain.   Skin:  "Negative for rash.   Neurological: Negative for headaches.   Hematological: Negative for adenopathy.   Psychiatric/Behavioral: The patient is not nervous/anxious.        Objective:       Vitals:    09/26/19 1339   BP: (!) 146/86   BP Location: Left arm   Patient Position: Sitting   BP Method: Small (Automatic)   Pulse: 73   Weight: 44.5 kg (98 lb 1.7 oz)   Height: 5' 3" (1.6 m)         Physical Exam   Constitutional: He is oriented to person, place, and time. He appears well-developed and well-nourished.   HENT:   Head: Normocephalic.   Mouth/Throat: Oropharynx is clear and moist. No oropharyngeal exudate.   Eyes: Conjunctivae are normal. No scleral icterus.   Neck: Normal range of motion. Neck supple. No thyromegaly present.   Cardiovascular: Normal rate, regular rhythm and normal heart sounds.   No murmur heard.  Pulmonary/Chest: Effort normal and breath sounds normal. He has no wheezes. He has no rales.   Abdominal: Soft. Bowel sounds are normal. He exhibits no distension and no mass. There is no hepatosplenomegaly. There is no tenderness. There is no rebound and no guarding.   Musculoskeletal: Normal range of motion. He exhibits no edema.   Lymphadenopathy:     He has no cervical adenopathy.     He has no axillary adenopathy.        Right: No supraclavicular adenopathy present.        Left: No supraclavicular adenopathy present.   Neurological: He is alert and oriented to person, place, and time. No cranial nerve deficit.   Skin: No rash noted. No erythema.   Psychiatric: He has a normal mood and affect.       Outside Facility labs Reviewed.  8/24/2017 BUN 17 Creatinine 1.27 Sodium 140 Potassium 3.7 Chloride 106 Calcium 8.7 Phosphorous 2.3 Hemoglobin 8.9 Hematocrit 28.6% plt ct t 190K from   3/7/20/17 Hemoglobin 10.3 g/dL  Hematocrit 31.7%    SPEP-nl    Results for JOHN MCGRATH JR. (MRN [id number]) as of 9/15/2017 10:06   Ref. Range 8/28/2017 12:00   [place] Free Light Chains Latest Ref Range: 0.33 - 1.94 mg/dL " 4.70 (H)   Lambda Free Light Chains Latest Ref Range: 0.57 - 2.63 mg/dL 3.35 (H)   Kappa/Lambda FLC Ratio Latest Ref Range: 0.26 - 1.65  1.40     Lab Results   Component Value Date    IRON 62 09/09/2019    TIBC 284 09/09/2019    FERRITIN 171 09/09/2019     Results for JOHN MCGRATH JR. (MRN [id number]) as of 9/15/2017 10:06   Ref. Range 8/28/2017 12:00   Copper Latest Ref Range: 665 - 1480 ug/L 1239   Differential Method Unknown Automated   RBC Folate Latest Ref Range: 280 - 791 ng/mL 696   Zinc, Serum-ALT Latest Ref Range: 60 - 130 ug/dL 62     Lab Results   Component Value Date    WBC 4.41 09/09/2019    HGB 13.5 (L) 09/09/2019    HCT 40.4 09/09/2019     (H) 09/09/2019     09/09/2019     Lab Results   Component Value Date    IRON 62 09/09/2019    TIBC 284 09/09/2019    FERRITIN 171 09/09/2019     Results for TRESSA MCGRATH JR. (MRN [id number]) as of 9/26/2019 13:55   Ref. Range 9/9/2019 15:10   Protein, Serum Latest Ref Range: 6.0 - 8.4 g/dL 6.4   RBC Folate Latest Ref Range: 280 - 791 ng/mL 697   Zinc, Serum-ALT Latest Ref Range: 60 - 130 ug/dL 46 (L)       5/2019 Outside Facility [facility]  Indication: Dilatation of abdominal aorta.    Prior studies reviewed: None available.  Procedure: One or more of the following dose lowering techniques were utilized: Automated exposure control, iterative reconstruction technique, and/or adjustment of the mA and kV according to patient size.   Contiguous 1 mm images were obtained through the abdominal aorta and the pelvic vessels down through the bifurcation of the common femoral arteries. Additional 1 mm images were obtained after 100 ml intravenous contrast. The raw data was then sent to a separate post processing workstation where 3-D MIP images were obtained and vessel analysis was performed.    Findings: The abdominal aorta is normal in caliber. There is no significant atherosclerotic disease. No stenosis or aneurysm is seen.    The celiac, SMA and KAREN are patent.  The renal arteries are also patent.    In the pelvis, the iliac vessels and the common femoral artery are patent.    Other CT findings include a 1.5 cm rounded lesion in the right adrenal gland, most likely an adenoma. In the pelvis, there was a small amount of free fluid.    IMPRESSION:   1. No significant vascular disease is seen in the abdomen or pelvis. There is no atherosclerosis or aneurysm.  2. Probable adenoma in the right adrenal gland.  3. There is a small amount of free fluid in the pelvis, of uncertain etiology.               Assessment:       1. Anemia, unspecified type    2. Zinc deficiency    3. Macrocytosis    4. Adenoma of right adrenal gland    5. Diabetes mellitus type 2 in nonobese        Plan:   1-5  49 -year-old with history type 2 diabetes mellitus with diabetic chronic kidney disease,Chronic kidney disease stage III   Pt has a mild anemia dating back to at least 2016   Hb 13.5  g/dL  S/p IV Fe therapy with Injectafer completed 9/2017   Fe parameters wnl  S/p GI eval ( Dr. Rodriguez) - no bleeding source    Plan testing for macrocytosis  Cont to monitor     Begin OTC Zinc supp    Follow up with PCP for med mgmt    Follow-up in 3 mos with cbc,Fe studies, Zinc prior to f/u     Cc Emil Montejo M.D.        Alpesh Rodriguez II, MD

## 2019-12-30 ENCOUNTER — LAB VISIT (OUTPATIENT)
Dept: LAB | Facility: HOSPITAL | Age: 49
End: 2019-12-30
Attending: INTERNAL MEDICINE
Payer: MEDICARE

## 2019-12-30 DIAGNOSIS — D64.9 ANEMIA, UNSPECIFIED TYPE: ICD-10-CM

## 2019-12-30 DIAGNOSIS — E60 ZINC DEFICIENCY: ICD-10-CM

## 2019-12-30 LAB
BASOPHILS # BLD AUTO: 0.05 K/UL (ref 0–0.2)
BASOPHILS NFR BLD: 0.8 % (ref 0–1.9)
DIFFERENTIAL METHOD: ABNORMAL
EOSINOPHIL # BLD AUTO: 0.4 K/UL (ref 0–0.5)
EOSINOPHIL NFR BLD: 6.4 % (ref 0–8)
ERYTHROCYTE [DISTWIDTH] IN BLOOD BY AUTOMATED COUNT: 12 % (ref 11.5–14.5)
FERRITIN SERPL-MCNC: 198 NG/ML (ref 20–300)
HCT VFR BLD AUTO: 37.9 % (ref 40–54)
HGB BLD-MCNC: 12.6 G/DL (ref 14–18)
IMM GRANULOCYTES # BLD AUTO: 0.01 K/UL (ref 0–0.04)
IMM GRANULOCYTES NFR BLD AUTO: 0.2 % (ref 0–0.5)
IRON SERPL-MCNC: 73 UG/DL (ref 45–160)
LYMPHOCYTES # BLD AUTO: 1.1 K/UL (ref 1–4.8)
LYMPHOCYTES NFR BLD: 17.8 % (ref 18–48)
MCH RBC QN AUTO: 33.2 PG (ref 27–31)
MCHC RBC AUTO-ENTMCNC: 33.2 G/DL (ref 32–36)
MCV RBC AUTO: 100 FL (ref 82–98)
MONOCYTES # BLD AUTO: 0.6 K/UL (ref 0.3–1)
MONOCYTES NFR BLD: 10.7 % (ref 4–15)
NEUTROPHILS # BLD AUTO: 3.8 K/UL (ref 1.8–7.7)
NEUTROPHILS NFR BLD: 64.1 % (ref 38–73)
NRBC BLD-RTO: 0 /100 WBC
PLATELET # BLD AUTO: 152 K/UL (ref 150–350)
PMV BLD AUTO: 11.6 FL (ref 9.2–12.9)
RBC # BLD AUTO: 3.79 M/UL (ref 4.6–6.2)
SATURATED IRON: 28 % (ref 20–50)
TOTAL IRON BINDING CAPACITY: 263 UG/DL (ref 250–450)
TRANSFERRIN SERPL-MCNC: 178 MG/DL (ref 200–375)
WBC # BLD AUTO: 5.91 K/UL (ref 3.9–12.7)

## 2019-12-30 PROCEDURE — 36415 COLL VENOUS BLD VENIPUNCTURE: CPT

## 2019-12-30 PROCEDURE — 82728 ASSAY OF FERRITIN: CPT

## 2019-12-30 PROCEDURE — 83540 ASSAY OF IRON: CPT

## 2019-12-30 PROCEDURE — 84630 ASSAY OF ZINC: CPT

## 2019-12-30 PROCEDURE — 85025 COMPLETE CBC W/AUTO DIFF WBC: CPT

## 2020-01-02 ENCOUNTER — OFFICE VISIT (OUTPATIENT)
Dept: HEMATOLOGY/ONCOLOGY | Facility: CLINIC | Age: 50
End: 2020-01-02
Payer: MEDICARE

## 2020-01-02 VITALS
HEART RATE: 81 BPM | TEMPERATURE: 98 F | DIASTOLIC BLOOD PRESSURE: 68 MMHG | OXYGEN SATURATION: 98 % | WEIGHT: 95.44 LBS | SYSTOLIC BLOOD PRESSURE: 121 MMHG | HEIGHT: 63 IN | BODY MASS INDEX: 16.91 KG/M2

## 2020-01-02 DIAGNOSIS — D64.9 ANEMIA, UNSPECIFIED TYPE: Primary | ICD-10-CM

## 2020-01-02 DIAGNOSIS — E60 ZINC DEFICIENCY: ICD-10-CM

## 2020-01-02 LAB — ZINC SERPL-MCNC: 45 UG/DL (ref 60–130)

## 2020-01-02 PROCEDURE — 99214 OFFICE O/P EST MOD 30 MIN: CPT | Mod: PBBFAC | Performed by: INTERNAL MEDICINE

## 2020-01-02 PROCEDURE — 99999 PR PBB SHADOW E&M-EST. PATIENT-LVL IV: ICD-10-PCS | Mod: PBBFAC,,, | Performed by: INTERNAL MEDICINE

## 2020-01-02 PROCEDURE — 99999 PR PBB SHADOW E&M-EST. PATIENT-LVL IV: CPT | Mod: PBBFAC,,, | Performed by: INTERNAL MEDICINE

## 2020-01-02 PROCEDURE — 99213 PR OFFICE/OUTPT VISIT, EST, LEVL III, 20-29 MIN: ICD-10-PCS | Mod: S$PBB,,, | Performed by: INTERNAL MEDICINE

## 2020-01-02 PROCEDURE — 99213 OFFICE O/P EST LOW 20 MIN: CPT | Mod: S$PBB,,, | Performed by: INTERNAL MEDICINE

## 2020-01-02 RX ORDER — LISINOPRIL 5 MG/1
20 TABLET ORAL DAILY
COMMUNITY

## 2020-01-02 NOTE — PROGRESS NOTES
Subjective:       Patient ID: Jarrett Odonnell Jr. is a 49 y.o. male.    Chief Complaint: Follow-up       Diagnosis: KELI      49-year-old gentleman with past medical history of type 2 diabetes mellitus, CK D3, hypertension seen today for f/u for KELI  He is accompanied by his mother.    No history of blood transfusions.   Appetite and weight stable.  No fevers or night sweats.  CBC from 8/24/2017 revealed a white blood cell count of 4100/MM 3 hemoglobin of 8.9 g/dL hematocrit of 28.6% MCV of 82.4 and a platelet count of 199K .     S/p Injectafer completed 9/2017    Accompanied by mother    He is followed by GI for chronically loose stools  He has been prescribed Loperamide and Dicyclomine   Appetite and weight stable  No fatigue  No SOB/CP/lightheadedness  No melena, hematochezia  He is followed at outside facility for adrenal lesion  Recent imaging outside facility reveals probable adrenal adenoma     CBC  reveals  white blood cell count of 5910/MM 3 hemoglobin of  12.6 g/dL hematocrit of 37.9 % MCV of 100 and a platelet count of 152K .         Pt s/p GI eval 10/2017  EGD- nl esophagus, gastric polyp  Colonoscopy -[facility], o/w unremarkable        PAST MEDICAL HISTORY type 2 diabetes mellitus with diabetic chronic kidney disease, hypertensive chronic kidney disease, chronic kidney disease stage III      PAST SURGICAL HISTORY : Orchiectomy     SOCIAL HISTORY : No  history of tobacco use.  No history of ETOH use.  He lives with his parents.  He attends [employer].  No smoke or alcohol use      Review of Systems   Constitutional: Negative for appetite change, fatigue, fever and unexpected weight change.   HENT: Negative for mouth sores.    Eyes: Negative for visual disturbance.   Respiratory: Negative for cough and shortness of breath.    Cardiovascular: Negative for chest pain.   Gastrointestinal: Negative for abdominal pain and diarrhea.   Genitourinary: Negative for frequency.   Musculoskeletal: Negative for  "back pain.   Skin: Negative for rash.   Neurological: Negative for headaches.   Hematological: Negative for adenopathy.   Psychiatric/Behavioral: The patient is not nervous/anxious.        Objective:       Vitals:    01/02/20 1515   BP: 121/68   Pulse: 81   Temp: 97.9 °F (36.6 °C)   TempSrc: Oral   SpO2: 98%   Weight: 43.3 kg (95 lb 7.4 oz)   Height: 5' 3" (1.6 m)         Physical Exam   Constitutional: He is oriented to person, place, and time. He appears well-developed and well-nourished.   HENT:   Head: Normocephalic.   Mouth/Throat: Oropharynx is clear and moist. No oropharyngeal exudate.   Eyes: Conjunctivae are normal. No scleral icterus.   Neck: Normal range of motion. Neck supple. No thyromegaly present.   Cardiovascular: Normal rate, regular rhythm and normal heart sounds.   No murmur heard.  Pulmonary/Chest: Effort normal and breath sounds normal. He has no wheezes. He has no rales.   Abdominal: Soft. Bowel sounds are normal. He exhibits no distension and no mass. There is no hepatosplenomegaly. There is no tenderness. There is no rebound and no guarding.   Musculoskeletal: Normal range of motion. He exhibits no edema.   Lymphadenopathy:     He has no cervical adenopathy.     He has no axillary adenopathy.        Right: No supraclavicular adenopathy present.        Left: No supraclavicular adenopathy present.   Neurological: He is alert and oriented to person, place, and time. No cranial nerve deficit.   Skin: No rash noted. No erythema.   Psychiatric: He has a normal mood and affect.       Outside Facility labs Reviewed.  8/24/2017 BUN 17 Creatinine 1.27 Sodium 140 Potassium 3.7 Chloride 106 Calcium 8.7 Phosphorous 2.3 Hemoglobin 8.9 Hematocrit 28.6% plt ct t 190K from   3/7/20/17 Hemoglobin 10.3 g/dL  Hematocrit 31.7%    SPEP-nl    Results for JOHN MCGRATH JR. (MRN [id number]) as of 9/15/2017 10:06   Ref. Range 8/28/2017 12:00   [place] Free Light Chains Latest Ref Range: 0.33 - 1.94 mg/dL 4.70 (H) "   Lambda Free Light Chains Latest Ref Range: 0.57 - 2.63 mg/dL 3.35 (H)   Kappa/Lambda FLC Ratio Latest Ref Range: 0.26 - 1.65  1.40     Lab Results   Component Value Date    IRON 73 12/30/2019    TIBC 263 12/30/2019    FERRITIN 198 12/30/2019     Results for JOHN MCGRATH JR. (MRN [id number]) as of 9/15/2017 10:06   Ref. Range 8/28/2017 12:00   Copper Latest Ref Range: 665 - 1480 ug/L 1239   Differential Method Unknown Automated   RBC Folate Latest Ref Range: 280 - 791 ng/mL 696   Zinc, Serum-ALT Latest Ref Range: 60 - 130 ug/dL 62     Lab Results   Component Value Date    WBC 5.91 12/30/2019    HGB 12.6 (L) 12/30/2019    HCT 37.9 (L) 12/30/2019     (H) 12/30/2019     12/30/2019     Lab Results   Component Value Date    IRON 73 12/30/2019    TIBC 263 12/30/2019    FERRITIN 198 12/30/2019     Results for TRESSA MCGRATH JR. (MRN [id number]) as of 9/26/2019 13:55   Ref. Range 9/9/2019 15:10   Protein, Serum Latest Ref Range: 6.0 - 8.4 g/dL 6.4   RBC Folate Latest Ref Range: 280 - 791 ng/mL 697   Zinc, Serum-ALT Latest Ref Range: 60 - 130 ug/dL 46 (L)       Results for TRESSA MCGRATH JR. (MRN [id number]) as of 1/6/2020 12:46   Ref. Range 9/9/2019 15:10 12/30/2019 15:53   Zinc, Serum-ALT Latest Ref Range: 60 - 130 ug/dL 46 (L) 45 (L)       5/2019 Outside Facility [facility]  Indication: Dilatation of abdominal aorta.    Prior studies reviewed: None available.  Procedure: One or more of the following dose lowering techniques were utilized: Automated exposure control, iterative reconstruction technique, and/or adjustment of the mA and kV according to patient size.   Contiguous 1 mm images were obtained through the abdominal aorta and the pelvic vessels down through the bifurcation of the common femoral arteries. Additional 1 mm images were obtained after 100 ml intravenous contrast. The raw data was then sent to a separate post processing workstation where 3-D MIP images were obtained and vessel analysis was  performed.    Findings: The abdominal aorta is normal in caliber. There is no significant atherosclerotic disease. No stenosis or aneurysm is seen.    The celiac, SMA and KAREN are patent. The renal arteries are also patent.    In the pelvis, the iliac vessels and the common femoral artery are patent.    Other CT findings include a 1.5 cm rounded lesion in the right adrenal gland, most likely an adenoma. In the pelvis, there was a small amount of free fluid.    IMPRESSION:   1. No significant vascular disease is seen in the abdomen or pelvis. There is no atherosclerosis or aneurysm.  2. Probable adenoma in the right adrenal gland.  3. There is a small amount of free fluid in the pelvis, of uncertain etiology.               Assessment:       1. Anemia, unspecified type    2. Zinc deficiency        Plan:   1-2  49 -year-old with history type 2 diabetes mellitus with diabetic chronic kidney disease,Chronic kidney disease stage III   Pt has a mild anemia dating back to at least 2016   Hb 12.6  g/dL  S/p IV Fe therapy with Injectafer completed 9/2017   Fe parameters wnl  S/p GI eval ( Dr. Rodriguez) - no bleeding source    Increase OTC zinc supp  Cont to monitor zinc levels      Follow up with PCP for med mgmt  Follow-up with GI    Follow-up in 3 mos with cbc,Fe studies, Zinc prior to f/u     Cc Emil Montejo M.D.        Alpesh Rodriguez II, MD

## 2020-02-03 ENCOUNTER — LAB VISIT (OUTPATIENT)
Dept: LAB | Facility: HOSPITAL | Age: 50
End: 2020-02-03
Attending: INTERNAL MEDICINE
Payer: MEDICARE

## 2020-02-03 DIAGNOSIS — D64.9 ANEMIA, UNSPECIFIED TYPE: ICD-10-CM

## 2020-02-03 DIAGNOSIS — E60 ZINC DEFICIENCY: ICD-10-CM

## 2020-02-03 LAB
BASOPHILS # BLD AUTO: 0.04 K/UL (ref 0–0.2)
BASOPHILS NFR BLD: 0.8 % (ref 0–1.9)
DIFFERENTIAL METHOD: ABNORMAL
EOSINOPHIL # BLD AUTO: 0.3 K/UL (ref 0–0.5)
EOSINOPHIL NFR BLD: 6.2 % (ref 0–8)
ERYTHROCYTE [DISTWIDTH] IN BLOOD BY AUTOMATED COUNT: 12 % (ref 11.5–14.5)
HCT VFR BLD AUTO: 34.9 % (ref 40–54)
HGB BLD-MCNC: 11.6 G/DL (ref 14–18)
IMM GRANULOCYTES # BLD AUTO: 0.01 K/UL (ref 0–0.04)
IMM GRANULOCYTES NFR BLD AUTO: 0.2 % (ref 0–0.5)
LYMPHOCYTES # BLD AUTO: 1 K/UL (ref 1–4.8)
LYMPHOCYTES NFR BLD: 19.2 % (ref 18–48)
MCH RBC QN AUTO: 33.2 PG (ref 27–31)
MCHC RBC AUTO-ENTMCNC: 33.2 G/DL (ref 32–36)
MCV RBC AUTO: 100 FL (ref 82–98)
MONOCYTES # BLD AUTO: 0.6 K/UL (ref 0.3–1)
MONOCYTES NFR BLD: 11.8 % (ref 4–15)
NEUTROPHILS # BLD AUTO: 3.3 K/UL (ref 1.8–7.7)
NEUTROPHILS NFR BLD: 61.8 % (ref 38–73)
NRBC BLD-RTO: 0 /100 WBC
PLATELET # BLD AUTO: 149 K/UL (ref 150–350)
PMV BLD AUTO: 12 FL (ref 9.2–12.9)
RBC # BLD AUTO: 3.49 M/UL (ref 4.6–6.2)
WBC # BLD AUTO: 5.32 K/UL (ref 3.9–12.7)

## 2020-02-03 PROCEDURE — 85025 COMPLETE CBC W/AUTO DIFF WBC: CPT

## 2020-02-03 PROCEDURE — 84630 ASSAY OF ZINC: CPT

## 2020-02-05 LAB — ZINC SERPL-MCNC: 62 UG/DL (ref 60–130)

## 2020-02-06 ENCOUNTER — TELEPHONE (OUTPATIENT)
Dept: HEMATOLOGY/ONCOLOGY | Facility: CLINIC | Age: 50
End: 2020-02-06

## 2020-02-06 NOTE — TELEPHONE ENCOUNTER
I called the patient L/M that I called    ----- Message from Emani Rothman MD sent at 2/5/2020  1:46 PM CST -----  Notify pt that recent ZINC level is normal . He can stop taking ZINC . Thanks, SJ

## 2020-03-31 ENCOUNTER — TELEPHONE (OUTPATIENT)
Dept: HEMATOLOGY/ONCOLOGY | Facility: CLINIC | Age: 50
End: 2020-03-31

## 2020-03-31 NOTE — TELEPHONE ENCOUNTER
I called the patient mother. L/M that I called        The patient's mother called stating that they moved his appointment to May and would like to know if he need to take the Zinc until May?

## 2020-06-15 ENCOUNTER — LAB VISIT (OUTPATIENT)
Dept: LAB | Facility: HOSPITAL | Age: 50
End: 2020-06-15
Attending: INTERNAL MEDICINE
Payer: MEDICARE

## 2020-06-15 DIAGNOSIS — E60 ZINC DEFICIENCY: ICD-10-CM

## 2020-06-15 DIAGNOSIS — D64.9 ANEMIA, UNSPECIFIED TYPE: ICD-10-CM

## 2020-06-15 LAB
BASOPHILS # BLD AUTO: 0.03 K/UL (ref 0–0.2)
BASOPHILS NFR BLD: 0.7 % (ref 0–1.9)
DIFFERENTIAL METHOD: ABNORMAL
EOSINOPHIL # BLD AUTO: 0.2 K/UL (ref 0–0.5)
EOSINOPHIL NFR BLD: 3.8 % (ref 0–8)
ERYTHROCYTE [DISTWIDTH] IN BLOOD BY AUTOMATED COUNT: 12.1 % (ref 11.5–14.5)
HCT VFR BLD AUTO: 35 % (ref 40–54)
HGB BLD-MCNC: 11.4 G/DL (ref 14–18)
IMM GRANULOCYTES # BLD AUTO: 0.01 K/UL (ref 0–0.04)
IMM GRANULOCYTES NFR BLD AUTO: 0.2 % (ref 0–0.5)
LYMPHOCYTES # BLD AUTO: 0.9 K/UL (ref 1–4.8)
LYMPHOCYTES NFR BLD: 21.9 % (ref 18–48)
MCH RBC QN AUTO: 32.3 PG (ref 27–31)
MCHC RBC AUTO-ENTMCNC: 32.6 G/DL (ref 32–36)
MCV RBC AUTO: 99 FL (ref 82–98)
MONOCYTES # BLD AUTO: 0.5 K/UL (ref 0.3–1)
MONOCYTES NFR BLD: 10.8 % (ref 4–15)
NEUTROPHILS # BLD AUTO: 2.7 K/UL (ref 1.8–7.7)
NEUTROPHILS NFR BLD: 62.6 % (ref 38–73)
NRBC BLD-RTO: 0 /100 WBC
PLATELET # BLD AUTO: 142 K/UL (ref 150–350)
PMV BLD AUTO: 11.5 FL (ref 9.2–12.9)
RBC # BLD AUTO: 3.53 M/UL (ref 4.6–6.2)
WBC # BLD AUTO: 4.25 K/UL (ref 3.9–12.7)

## 2020-06-15 PROCEDURE — 84630 ASSAY OF ZINC: CPT

## 2020-06-15 PROCEDURE — 85025 COMPLETE CBC W/AUTO DIFF WBC: CPT

## 2020-06-17 ENCOUNTER — OFFICE VISIT (OUTPATIENT)
Dept: HEMATOLOGY/ONCOLOGY | Facility: CLINIC | Age: 50
End: 2020-06-17
Payer: MEDICARE

## 2020-06-17 VITALS
HEIGHT: 63 IN | SYSTOLIC BLOOD PRESSURE: 119 MMHG | HEART RATE: 88 BPM | DIASTOLIC BLOOD PRESSURE: 80 MMHG | OXYGEN SATURATION: 99 % | TEMPERATURE: 98 F | WEIGHT: 89.94 LBS | BODY MASS INDEX: 15.94 KG/M2

## 2020-06-17 DIAGNOSIS — D69.6 THROMBOCYTOPENIA: ICD-10-CM

## 2020-06-17 DIAGNOSIS — D50.9 IRON DEFICIENCY ANEMIA, UNSPECIFIED IRON DEFICIENCY ANEMIA TYPE: Primary | ICD-10-CM

## 2020-06-17 LAB — ZINC SERPL-MCNC: 66 UG/DL (ref 60–130)

## 2020-06-17 PROCEDURE — 99214 OFFICE O/P EST MOD 30 MIN: CPT | Mod: PBBFAC | Performed by: INTERNAL MEDICINE

## 2020-06-17 PROCEDURE — 99213 OFFICE O/P EST LOW 20 MIN: CPT | Mod: S$PBB,,, | Performed by: INTERNAL MEDICINE

## 2020-06-17 PROCEDURE — 99213 PR OFFICE/OUTPT VISIT, EST, LEVL III, 20-29 MIN: ICD-10-PCS | Mod: S$PBB,,, | Performed by: INTERNAL MEDICINE

## 2020-06-17 PROCEDURE — 99999 PR PBB SHADOW E&M-EST. PATIENT-LVL IV: ICD-10-PCS | Mod: PBBFAC,,, | Performed by: INTERNAL MEDICINE

## 2020-06-17 PROCEDURE — 99999 PR PBB SHADOW E&M-EST. PATIENT-LVL IV: CPT | Mod: PBBFAC,,, | Performed by: INTERNAL MEDICINE

## 2020-06-17 RX ORDER — SIMETHICONE 125 MG
125 TABLET,CHEWABLE ORAL EVERY 6 HOURS PRN
COMMUNITY

## 2020-06-17 NOTE — PROGRESS NOTES
Subjective:       Patient ID: Jarrett Odonnell Jr. is a 49 y.o. male.    Chief Complaint: Follow-up       Diagnosis: KELI      49-year-old gentleman with past medical history of type 2 diabetes mellitus, CK D3, hypertension seen today for f/u for KELI  He is accompanied by his mother.    No history of blood transfusions.   Appetite and weight stable.  No fevers or night sweats.  CBC from 8/24/2017 revealed a white blood cell count of 4100/MM 3 hemoglobin of 8.9 g/dL hematocrit of 28.6% MCV of 82.4 and a platelet count of 199K .     S/p Injectafer completed 9/2017    Accompanied by mother    He recently underwent dental implant for a tooth fracture    He no longer has loose stools  Appetite and weight stable  No fatigue  No SOB/CP/lightheadedness  No melena, hematochezia  He is followed at outside facility for adrenal lesion  Recent imaging outside facility reveals probable adrenal adenoma   No bleeding-nasal/rectal/urinary     hemoglobin of  11.4 g/dL       Pt s/p GI eval 10/2017  EGD- nl esophagus, gastric polyp  Colonoscopy -[facility], o/w unremarkable        PAST MEDICAL HISTORY type 2 diabetes mellitus with diabetic chronic kidney disease, hypertensive chronic kidney disease, chronic kidney disease stage III      PAST SURGICAL HISTORY : Orchiectomy     SOCIAL HISTORY : No  history of tobacco use.  No history of ETOH use.  He lives with his parents.  He attends [employer].  No smoke or alcohol use      Review of Systems   Constitutional: Negative for appetite change, fatigue, fever and unexpected weight change.   HENT: Negative for mouth sores.    Eyes: Negative for visual disturbance.   Respiratory: Negative for cough and shortness of breath.    Cardiovascular: Negative for chest pain.   Gastrointestinal: Negative for abdominal pain and diarrhea.   Genitourinary: Negative for frequency.   Musculoskeletal: Negative for back pain.   Skin: Negative for rash.   Neurological: Negative for headaches.  "  Hematological: Negative for adenopathy.   Psychiatric/Behavioral: The patient is not nervous/anxious.        Objective:       Vitals:    06/17/20 1332   BP: 119/80   BP Location: Right arm   Patient Position: Sitting   BP Method: Medium (Automatic)   Pulse: 88   Temp: 98.4 °F (36.9 °C)   TempSrc: Oral   SpO2: 99%   Weight: 40.8 kg (89 lb 15.2 oz)   Height: 5' 3" (1.6 m)         Physical Exam  Constitutional:       Appearance: He is well-developed.   HENT:      Head: Normocephalic.      Mouth/Throat:      Pharynx: No oropharyngeal exudate.   Eyes:      General: No scleral icterus.     Conjunctiva/sclera: Conjunctivae normal.   Neck:      Musculoskeletal: Normal range of motion and neck supple.      Thyroid: No thyromegaly.   Cardiovascular:      Rate and Rhythm: Normal rate and regular rhythm.      Heart sounds: Normal heart sounds. No murmur.   Pulmonary:      Effort: Pulmonary effort is normal.      Breath sounds: Normal breath sounds. No wheezing or rales.   Abdominal:      General: Bowel sounds are normal. There is no distension.      Palpations: Abdomen is soft. There is no mass.      Tenderness: There is no abdominal tenderness. There is no guarding or rebound.   Musculoskeletal: Normal range of motion.   Lymphadenopathy:      Cervical: No cervical adenopathy.      Upper Body:      Right upper body: No supraclavicular adenopathy.      Left upper body: No supraclavicular adenopathy.   Skin:     Findings: No erythema or rash.   Neurological:      Mental Status: He is alert and oriented to person, place, and time.      Cranial Nerves: No cranial nerve deficit.         Outside Facility labs Reviewed.  8/24/2017 BUN 17 Creatinine 1.27 Sodium 140 Potassium 3.7 Chloride 106 Calcium 8.7 Phosphorous 2.3 Hemoglobin 8.9 Hematocrit 28.6% plt ct t 190K from   3/7/20/17 Hemoglobin 10.3 g/dL  Hematocrit 31.7%    SPEP-nl    Results for JOHN MCGRATH JR. (MRN [id number]) as of 9/15/2017 10:06   Ref. Range 8/28/2017 12:00 "   [place] Free Light Chains Latest Ref Range: 0.33 - 1.94 mg/dL 4.70 (H)   Lambda Free Light Chains Latest Ref Range: 0.57 - 2.63 mg/dL 3.35 (H)   Kappa/Lambda FLC Ratio Latest Ref Range: 0.26 - 1.65  1.40     Lab Results   Component Value Date    IRON 73 12/30/2019    TIBC 263 12/30/2019    FERRITIN 198 12/30/2019     Results for JOHN MCGRATH JR. (MRN [id number]) as of 9/15/2017 10:06   Ref. Range 8/28/2017 12:00   Copper Latest Ref Range: 665 - 1480 ug/L 1239   Differential Method Unknown Automated   RBC Folate Latest Ref Range: 280 - 791 ng/mL 696   Zinc, Serum-ALT Latest Ref Range: 60 - 130 ug/dL 62     Lab Results   Component Value Date    WBC 4.25 06/15/2020    HGB 11.4 (L) 06/15/2020    HCT 35.0 (L) 06/15/2020    MCV 99 (H) 06/15/2020     (L) 06/15/2020     Lab Results   Component Value Date    IRON 73 12/30/2019    TIBC 263 12/30/2019    FERRITIN 198 12/30/2019     Results for TRESSA MCGRATH JR. (MRN [id number]) as of 9/26/2019 13:55   Ref. Range 9/9/2019 15:10   Protein, Serum Latest Ref Range: 6.0 - 8.4 g/dL 6.4   RBC Folate Latest Ref Range: 280 - 791 ng/mL 697   Zinc, Serum-ALT Latest Ref Range: 60 - 130 ug/dL 46 (L)       Results for TRESSA MCGRATH JR. (MRN [id number]) as of 1/6/2020 12:46   Ref. Range 9/9/2019 15:10 12/30/2019 15:53   Zinc, Serum-ALT Latest Ref Range: 60 - 130 ug/dL 46 (L) 45 (L)       5/2019 Outside Facility [facility]  Indication: Dilatation of abdominal aorta.    Prior studies reviewed: None available.  Procedure: One or more of the following dose lowering techniques were utilized: Automated exposure control, iterative reconstruction technique, and/or adjustment of the mA and kV according to patient size.   Contiguous 1 mm images were obtained through the abdominal aorta and the pelvic vessels down through the bifurcation of the common femoral arteries. Additional 1 mm images were obtained after 100 ml intravenous contrast. The raw data was then sent to a separate post processing  workstation where 3-D MIP images were obtained and vessel analysis was performed.    Findings: The abdominal aorta is normal in caliber. There is no significant atherosclerotic disease. No stenosis or aneurysm is seen.    The celiac, SMA and KAREN are patent. The renal arteries are also patent.    In the pelvis, the iliac vessels and the common femoral artery are patent.    Other CT findings include a 1.5 cm rounded lesion in the right adrenal gland, most likely an adenoma. In the pelvis, there was a small amount of free fluid.    IMPRESSION:   1. No significant vascular disease is seen in the abdomen or pelvis. There is no atherosclerosis or aneurysm.  2. Probable adenoma in the right adrenal gland.  3. There is a small amount of free fluid in the pelvis, of uncertain etiology.               Assessment:       1. Iron deficiency anemia, unspecified iron deficiency anemia type    2. Thrombocytopenia        Plan:   1-2  49 -year-old with history type 2 diabetes mellitus with diabetic chronic kidney disease,Chronic kidney disease stage III   Pt has a mild anemia dating back to at least 2016   Hb 11.4  g/dL  S/p IV Fe therapy with Injectafer completed 9/2017   No recent Fe studies  S/p GI eval ( Dr. Rodriguez) - no bleeding source  Cont to monitor      Zinc level wnl    Patient's thrombocytopenia noted. Patient asymptomatic. No active bleeding-nasal/urinary/rectal. Continue to monitor.     Follow up with PCP for med mgmt  Follow-up with GI    Follow-up in 3 mos with cbc,Fe studies,  prior to f/u     Cc Emil Montejo M.D.        Alpesh Rodriguez II, MD

## 2020-09-21 ENCOUNTER — LAB VISIT (OUTPATIENT)
Dept: LAB | Facility: HOSPITAL | Age: 50
End: 2020-09-21
Attending: INTERNAL MEDICINE
Payer: MEDICARE

## 2020-09-21 DIAGNOSIS — D50.9 IRON DEFICIENCY ANEMIA, UNSPECIFIED IRON DEFICIENCY ANEMIA TYPE: ICD-10-CM

## 2020-09-21 LAB
BASOPHILS # BLD AUTO: 0.05 K/UL (ref 0–0.2)
BASOPHILS NFR BLD: 1 % (ref 0–1.9)
DIFFERENTIAL METHOD: ABNORMAL
EOSINOPHIL # BLD AUTO: 0.3 K/UL (ref 0–0.5)
EOSINOPHIL NFR BLD: 5.4 % (ref 0–8)
ERYTHROCYTE [DISTWIDTH] IN BLOOD BY AUTOMATED COUNT: 11.9 % (ref 11.5–14.5)
FERRITIN SERPL-MCNC: 169 NG/ML (ref 20–300)
HCT VFR BLD AUTO: 35.7 % (ref 40–54)
HGB BLD-MCNC: 11.9 G/DL (ref 14–18)
IMM GRANULOCYTES # BLD AUTO: 0.01 K/UL (ref 0–0.04)
IMM GRANULOCYTES NFR BLD AUTO: 0.2 % (ref 0–0.5)
IRON SERPL-MCNC: 83 UG/DL (ref 45–160)
LYMPHOCYTES # BLD AUTO: 1.1 K/UL (ref 1–4.8)
LYMPHOCYTES NFR BLD: 22.2 % (ref 18–48)
MCH RBC QN AUTO: 33.3 PG (ref 27–31)
MCHC RBC AUTO-ENTMCNC: 33.3 G/DL (ref 32–36)
MCV RBC AUTO: 100 FL (ref 82–98)
MONOCYTES # BLD AUTO: 0.4 K/UL (ref 0.3–1)
MONOCYTES NFR BLD: 9.1 % (ref 4–15)
NEUTROPHILS # BLD AUTO: 3 K/UL (ref 1.8–7.7)
NEUTROPHILS NFR BLD: 62.1 % (ref 38–73)
NRBC BLD-RTO: 0 /100 WBC
PLATELET # BLD AUTO: 155 K/UL (ref 150–350)
PMV BLD AUTO: 11.5 FL (ref 9.2–12.9)
RBC # BLD AUTO: 3.57 M/UL (ref 4.6–6.2)
SATURATED IRON: 28 % (ref 20–50)
TOTAL IRON BINDING CAPACITY: 292 UG/DL (ref 250–450)
TRANSFERRIN SERPL-MCNC: 197 MG/DL (ref 200–375)
WBC # BLD AUTO: 4.81 K/UL (ref 3.9–12.7)

## 2020-09-21 PROCEDURE — 83540 ASSAY OF IRON: CPT | Mod: AY

## 2020-09-21 PROCEDURE — 85025 COMPLETE CBC W/AUTO DIFF WBC: CPT

## 2020-09-21 PROCEDURE — 82728 ASSAY OF FERRITIN: CPT

## 2020-09-30 ENCOUNTER — OFFICE VISIT (OUTPATIENT)
Dept: HEMATOLOGY/ONCOLOGY | Facility: CLINIC | Age: 50
End: 2020-09-30
Payer: MEDICARE

## 2020-09-30 VITALS
SYSTOLIC BLOOD PRESSURE: 130 MMHG | OXYGEN SATURATION: 98 % | WEIGHT: 92.13 LBS | BODY MASS INDEX: 16.32 KG/M2 | HEIGHT: 63 IN | HEART RATE: 84 BPM | TEMPERATURE: 98 F | DIASTOLIC BLOOD PRESSURE: 88 MMHG

## 2020-09-30 DIAGNOSIS — D64.9 ANEMIA, UNSPECIFIED TYPE: Primary | ICD-10-CM

## 2020-09-30 PROCEDURE — 99213 PR OFFICE/OUTPT VISIT, EST, LEVL III, 20-29 MIN: ICD-10-PCS | Mod: S$PBB,,, | Performed by: INTERNAL MEDICINE

## 2020-09-30 PROCEDURE — 99214 OFFICE O/P EST MOD 30 MIN: CPT | Mod: PBBFAC | Performed by: INTERNAL MEDICINE

## 2020-09-30 PROCEDURE — 99999 PR PBB SHADOW E&M-EST. PATIENT-LVL IV: CPT | Mod: PBBFAC,,, | Performed by: INTERNAL MEDICINE

## 2020-09-30 PROCEDURE — 99213 OFFICE O/P EST LOW 20 MIN: CPT | Mod: S$PBB,,, | Performed by: INTERNAL MEDICINE

## 2020-09-30 PROCEDURE — 99999 PR PBB SHADOW E&M-EST. PATIENT-LVL IV: ICD-10-PCS | Mod: PBBFAC,,, | Performed by: INTERNAL MEDICINE

## 2020-09-30 RX ORDER — OMEPRAZOLE 20 MG/1
20 CAPSULE, DELAYED RELEASE ORAL EVERY MORNING
COMMUNITY
Start: 2020-09-16

## 2020-09-30 NOTE — PROGRESS NOTES
Subjective:       Patient ID: Jarrett Odonnell Jr. is a 50 y.o. male.    Chief Complaint: Follow-up (iron deficiency anemia)       Diagnosis: KELI      50-year-old gentleman with past medical history of type 2 diabetes mellitus, CK D3, hypertension seen today for f/u for KELI  He is accompanied by his mother.    No history of blood transfusions.   Appetite and weight stable.  No fevers or night sweats.  CBC from 8/24/2017 revealed a white blood cell count of 4100/MM 3 hemoglobin of 8.9 g/dL hematocrit of 28.6% MCV of 82.4 and a platelet count of 199K .     S/p Injectafer completed 9/2017    Accompanied by mother    Appetite and weight stable  No fatigue  No SOB/CP/lightheadedness  No melena, hematochezia or change in bowel habits      He is followed at outside facility for adrenal lesion  Recent imaging outside facility reveals probable adrenal adenoma        hemoglobin of  11.9 g/dL       Pt s/p GI eval 10/2017  EGD- nl esophagus, gastric polyp  Colonoscopy -[facility], o/w unremarkable        PAST MEDICAL HISTORY type 2 diabetes mellitus with diabetic chronic kidney disease, hypertensive chronic kidney disease, chronic kidney disease stage III      PAST SURGICAL HISTORY : Orchiectomy     SOCIAL HISTORY : No  history of tobacco use.  No history of ETOH use.  He lives with his parents.  He attends [employer].  No smoke or alcohol use      Review of Systems   Constitutional: Negative for appetite change, fatigue, fever and unexpected weight change.   HENT: Negative for mouth sores.    Eyes: Negative for visual disturbance.   Respiratory: Negative for cough and shortness of breath.    Cardiovascular: Negative for chest pain.   Gastrointestinal: Negative for abdominal pain and diarrhea.   Genitourinary: Negative for frequency.   Musculoskeletal: Negative for back pain.   Skin: Negative for rash.   Neurological: Negative for headaches.   Hematological: Negative for adenopathy.   Psychiatric/Behavioral: The patient is  "not nervous/anxious.        Objective:       Vitals:    09/30/20 1449   BP: 130/88   BP Location: Right arm   Patient Position: Sitting   BP Method: Medium (Automatic)   Pulse: 84   Temp: 97.5 °F (36.4 °C)   TempSrc: Oral   SpO2: 98%   Weight: 41.8 kg (92 lb 2.4 oz)   Height: 5' 3" (1.6 m)         Physical Exam  Constitutional:       Appearance: He is well-developed.   HENT:      Head: Normocephalic.      Mouth/Throat:      Pharynx: No oropharyngeal exudate.   Eyes:      General: No scleral icterus.     Conjunctiva/sclera: Conjunctivae normal.   Neck:      Musculoskeletal: Normal range of motion and neck supple.      Thyroid: No thyromegaly.   Cardiovascular:      Rate and Rhythm: Normal rate and regular rhythm.      Heart sounds: Normal heart sounds. No murmur.   Pulmonary:      Effort: Pulmonary effort is normal.      Breath sounds: Normal breath sounds. No wheezing or rales.   Abdominal:      General: Bowel sounds are normal. There is no distension.      Palpations: Abdomen is soft. There is no mass.      Tenderness: There is no abdominal tenderness. There is no guarding or rebound.   Musculoskeletal: Normal range of motion.   Lymphadenopathy:      Cervical: No cervical adenopathy.      Upper Body:      Right upper body: No supraclavicular adenopathy.      Left upper body: No supraclavicular adenopathy.   Skin:     Findings: No erythema or rash.   Neurological:      Mental Status: He is alert and oriented to person, place, and time.      Cranial Nerves: No cranial nerve deficit.         Outside Facility labs Reviewed.  8/24/2017 BUN 17 Creatinine 1.27 Sodium 140 Potassium 3.7 Chloride 106 Calcium 8.7 Phosphorous 2.3 Hemoglobin 8.9 Hematocrit 28.6% plt ct t 190K from   3/7/20/17 Hemoglobin 10.3 g/dL  Hematocrit 31.7%    SPEP-nl    Results for JOHN MCGRATH JR. (MRN [id number]) as of 9/15/2017 10:06   Ref. Range 8/28/2017 12:00   [place] Free Light Chains Latest Ref Range: 0.33 - 1.94 mg/dL 4.70 (H)   Lambda Free " Light Chains Latest Ref Range: 0.57 - 2.63 mg/dL 3.35 (H)   Kappa/Lambda FLC Ratio Latest Ref Range: 0.26 - 1.65  1.40     Lab Results   Component Value Date    IRON 83 09/21/2020    TIBC 292 09/21/2020    FERRITIN 169 09/21/2020     Results for JOHN MCGRATH JR. (MRN [id number]) as of 9/15/2017 10:06   Ref. Range 8/28/2017 12:00   Copper Latest Ref Range: 665 - 1480 ug/L 1239   Differential Method Unknown Automated   RBC Folate Latest Ref Range: 280 - 791 ng/mL 696   Zinc, Serum-ALT Latest Ref Range: 60 - 130 ug/dL 62     Lab Results   Component Value Date    WBC 4.81 09/21/2020    HGB 11.9 (L) 09/21/2020    HCT 35.7 (L) 09/21/2020     (H) 09/21/2020     09/21/2020     Lab Results   Component Value Date    IRON 83 09/21/2020    TIBC 292 09/21/2020    FERRITIN 169 09/21/2020     Results for TRESSA MCGRATH JR. (MRN [id number]) as of 9/26/2019 13:55   Ref. Range 9/9/2019 15:10   Protein, Serum Latest Ref Range: 6.0 - 8.4 g/dL 6.4   RBC Folate Latest Ref Range: 280 - 791 ng/mL 697   Zinc, Serum-ALT Latest Ref Range: 60 - 130 ug/dL 46 (L)       Results for TRESSA MCGRATH JR. (MRN [id number]) as of 1/6/2020 12:46   Ref. Range 9/9/2019 15:10 12/30/2019 15:53   Zinc, Serum-ALT Latest Ref Range: 60 - 130 ug/dL 46 (L) 45 (L)       5/2019 Outside Facility [facility]  Indication: Dilatation of abdominal aorta.    Prior studies reviewed: None available.  Procedure: One or more of the following dose lowering techniques were utilized: Automated exposure control, iterative reconstruction technique, and/or adjustment of the mA and kV according to patient size.   Contiguous 1 mm images were obtained through the abdominal aorta and the pelvic vessels down through the bifurcation of the common femoral arteries. Additional 1 mm images were obtained after 100 ml intravenous contrast. The raw data was then sent to a separate post processing workstation where 3-D MIP images were obtained and vessel analysis was performed.    Findings:  The abdominal aorta is normal in caliber. There is no significant atherosclerotic disease. No stenosis or aneurysm is seen.    The celiac, SMA and KAREN are patent. The renal arteries are also patent.    In the pelvis, the iliac vessels and the common femoral artery are patent.    Other CT findings include a 1.5 cm rounded lesion in the right adrenal gland, most likely an adenoma. In the pelvis, there was a small amount of free fluid.    IMPRESSION:   1. No significant vascular disease is seen in the abdomen or pelvis. There is no atherosclerosis or aneurysm.  2. Probable adenoma in the right adrenal gland.  3. There is a small amount of free fluid in the pelvis, of uncertain etiology.               Assessment:       1. Anemia, unspecified type        Plan:   1.  49 -year-old with history type 2 diabetes mellitus with diabetic chronic kidney disease,Chronic kidney disease stage III   Pt has a mild anemia dating back to at least 2016   Hb 11.9  g/dL  S/p IV Fe therapy with Injectafer completed 9/2017   Fe paramters OK  S/p GI eval ( Dr. Rodriguez) - no bleeding source  Zinc level wnl      Follow up with PCP for med mgmt      Follow-up in 4 mos with cbc,Fe studies,  prior to f/u     Cc Emil Montejo M.D.        Alpesh Rodriguez II, MD

## 2020-10-26 NOTE — Clinical Note
Labs prior to f/u 3 mos 56 yo F with h/o hypothyroidism, CLL(Diagnosed  in 11/2019, s/p chemo x 3- last oncology visit 9/2020) was c/o vague abdominal pain x 3 years. Pt hag Gyn evaluation- s/p CT abdomen- scheduled for laparoscopic bilateral salpingo-oophorectomy, removal of Essure on 10/30/20

## 2021-01-25 ENCOUNTER — LAB VISIT (OUTPATIENT)
Dept: LAB | Facility: HOSPITAL | Age: 51
End: 2021-01-25
Attending: INTERNAL MEDICINE
Payer: MEDICARE

## 2021-01-25 DIAGNOSIS — D64.9 ANEMIA, UNSPECIFIED TYPE: ICD-10-CM

## 2021-01-25 LAB
BASOPHILS # BLD AUTO: 0.04 K/UL (ref 0–0.2)
BASOPHILS NFR BLD: 0.8 % (ref 0–1.9)
DIFFERENTIAL METHOD: ABNORMAL
EOSINOPHIL # BLD AUTO: 0.3 K/UL (ref 0–0.5)
EOSINOPHIL NFR BLD: 6.3 % (ref 0–8)
ERYTHROCYTE [DISTWIDTH] IN BLOOD BY AUTOMATED COUNT: 12 % (ref 11.5–14.5)
FERRITIN SERPL-MCNC: 120 NG/ML (ref 20–300)
HCT VFR BLD AUTO: 34.7 % (ref 40–54)
HGB BLD-MCNC: 11.4 G/DL (ref 14–18)
IMM GRANULOCYTES # BLD AUTO: 0.01 K/UL (ref 0–0.04)
IMM GRANULOCYTES NFR BLD AUTO: 0.2 % (ref 0–0.5)
IRON SERPL-MCNC: 69 UG/DL (ref 45–160)
LYMPHOCYTES # BLD AUTO: 1.2 K/UL (ref 1–4.8)
LYMPHOCYTES NFR BLD: 24.5 % (ref 18–48)
MCH RBC QN AUTO: 33 PG (ref 27–31)
MCHC RBC AUTO-ENTMCNC: 32.9 G/DL (ref 32–36)
MCV RBC AUTO: 101 FL (ref 82–98)
MONOCYTES # BLD AUTO: 0.6 K/UL (ref 0.3–1)
MONOCYTES NFR BLD: 11.3 % (ref 4–15)
NEUTROPHILS # BLD AUTO: 2.9 K/UL (ref 1.8–7.7)
NEUTROPHILS NFR BLD: 56.9 % (ref 38–73)
NRBC BLD-RTO: 0 /100 WBC
PLATELET # BLD AUTO: 148 K/UL (ref 150–350)
PMV BLD AUTO: 11.5 FL (ref 9.2–12.9)
RBC # BLD AUTO: 3.45 M/UL (ref 4.6–6.2)
SATURATED IRON: 23 % (ref 20–50)
TOTAL IRON BINDING CAPACITY: 296 UG/DL (ref 250–450)
TRANSFERRIN SERPL-MCNC: 200 MG/DL (ref 200–375)
WBC # BLD AUTO: 5.06 K/UL (ref 3.9–12.7)

## 2021-01-25 PROCEDURE — 85025 COMPLETE CBC W/AUTO DIFF WBC: CPT

## 2021-01-25 PROCEDURE — 36415 COLL VENOUS BLD VENIPUNCTURE: CPT

## 2021-01-25 PROCEDURE — 82728 ASSAY OF FERRITIN: CPT

## 2021-01-25 PROCEDURE — 83540 ASSAY OF IRON: CPT

## 2021-01-27 ENCOUNTER — OFFICE VISIT (OUTPATIENT)
Dept: HEMATOLOGY/ONCOLOGY | Facility: CLINIC | Age: 51
End: 2021-01-27
Payer: MEDICARE

## 2021-01-27 VITALS
OXYGEN SATURATION: 99 % | SYSTOLIC BLOOD PRESSURE: 130 MMHG | WEIGHT: 92.38 LBS | HEIGHT: 63 IN | HEART RATE: 77 BPM | TEMPERATURE: 97 F | BODY MASS INDEX: 16.37 KG/M2 | DIASTOLIC BLOOD PRESSURE: 81 MMHG

## 2021-01-27 DIAGNOSIS — D50.9 IRON DEFICIENCY ANEMIA, UNSPECIFIED IRON DEFICIENCY ANEMIA TYPE: Primary | ICD-10-CM

## 2021-01-27 PROCEDURE — 99214 OFFICE O/P EST MOD 30 MIN: CPT | Mod: PBBFAC | Performed by: INTERNAL MEDICINE

## 2021-01-27 PROCEDURE — 99213 OFFICE O/P EST LOW 20 MIN: CPT | Mod: S$PBB,,, | Performed by: INTERNAL MEDICINE

## 2021-01-27 PROCEDURE — 99999 PR PBB SHADOW E&M-EST. PATIENT-LVL IV: ICD-10-PCS | Mod: PBBFAC,,, | Performed by: INTERNAL MEDICINE

## 2021-01-27 PROCEDURE — 99213 PR OFFICE/OUTPT VISIT, EST, LEVL III, 20-29 MIN: ICD-10-PCS | Mod: S$PBB,,, | Performed by: INTERNAL MEDICINE

## 2021-01-27 PROCEDURE — 99999 PR PBB SHADOW E&M-EST. PATIENT-LVL IV: CPT | Mod: PBBFAC,,, | Performed by: INTERNAL MEDICINE

## 2021-05-31 ENCOUNTER — LAB VISIT (OUTPATIENT)
Dept: LAB | Facility: HOSPITAL | Age: 51
End: 2021-05-31
Attending: INTERNAL MEDICINE
Payer: MEDICARE

## 2021-05-31 DIAGNOSIS — D50.9 IRON DEFICIENCY ANEMIA, UNSPECIFIED IRON DEFICIENCY ANEMIA TYPE: ICD-10-CM

## 2021-05-31 LAB
BASOPHILS # BLD AUTO: 0.03 K/UL (ref 0–0.2)
BASOPHILS NFR BLD: 0.9 % (ref 0–1.9)
DIFFERENTIAL METHOD: ABNORMAL
EOSINOPHIL # BLD AUTO: 0.2 K/UL (ref 0–0.5)
EOSINOPHIL NFR BLD: 5.4 % (ref 0–8)
ERYTHROCYTE [DISTWIDTH] IN BLOOD BY AUTOMATED COUNT: 11.8 % (ref 11.5–14.5)
FERRITIN SERPL-MCNC: 110 NG/ML (ref 20–300)
HCT VFR BLD AUTO: 35.9 % (ref 40–54)
HGB BLD-MCNC: 12.1 G/DL (ref 14–18)
IMM GRANULOCYTES # BLD AUTO: 0.01 K/UL (ref 0–0.04)
IMM GRANULOCYTES NFR BLD AUTO: 0.3 % (ref 0–0.5)
IRON SERPL-MCNC: 80 UG/DL (ref 45–160)
LYMPHOCYTES # BLD AUTO: 0.9 K/UL (ref 1–4.8)
LYMPHOCYTES NFR BLD: 24.7 % (ref 18–48)
MCH RBC QN AUTO: 33.4 PG (ref 27–31)
MCHC RBC AUTO-ENTMCNC: 33.7 G/DL (ref 32–36)
MCV RBC AUTO: 99 FL (ref 82–98)
MONOCYTES # BLD AUTO: 0.4 K/UL (ref 0.3–1)
MONOCYTES NFR BLD: 11.4 % (ref 4–15)
NEUTROPHILS # BLD AUTO: 2 K/UL (ref 1.8–7.7)
NEUTROPHILS NFR BLD: 57.3 % (ref 38–73)
NRBC BLD-RTO: 0 /100 WBC
PLATELET # BLD AUTO: 134 K/UL (ref 150–450)
PMV BLD AUTO: 12.1 FL (ref 9.2–12.9)
RBC # BLD AUTO: 3.62 M/UL (ref 4.6–6.2)
SATURATED IRON: 27 % (ref 20–50)
TOTAL IRON BINDING CAPACITY: 300 UG/DL (ref 250–450)
TRANSFERRIN SERPL-MCNC: 203 MG/DL (ref 200–375)
WBC # BLD AUTO: 3.52 K/UL (ref 3.9–12.7)

## 2021-05-31 PROCEDURE — 82570 ASSAY OF URINE CREATININE: CPT | Performed by: INTERNAL MEDICINE

## 2021-05-31 PROCEDURE — 85025 COMPLETE CBC W/AUTO DIFF WBC: CPT | Performed by: INTERNAL MEDICINE

## 2021-05-31 PROCEDURE — 83540 ASSAY OF IRON: CPT | Performed by: INTERNAL MEDICINE

## 2021-05-31 PROCEDURE — 82728 ASSAY OF FERRITIN: CPT | Performed by: INTERNAL MEDICINE

## 2021-06-03 ENCOUNTER — OFFICE VISIT (OUTPATIENT)
Dept: HEMATOLOGY/ONCOLOGY | Facility: CLINIC | Age: 51
End: 2021-06-03
Payer: MEDICARE

## 2021-06-03 VITALS
TEMPERATURE: 98 F | OXYGEN SATURATION: 98 % | BODY MASS INDEX: 16.37 KG/M2 | DIASTOLIC BLOOD PRESSURE: 76 MMHG | HEIGHT: 63 IN | WEIGHT: 92.38 LBS | SYSTOLIC BLOOD PRESSURE: 115 MMHG | HEART RATE: 72 BPM

## 2021-06-03 DIAGNOSIS — N18.9 CHRONIC KIDNEY DISEASE, UNSPECIFIED CKD STAGE: ICD-10-CM

## 2021-06-03 DIAGNOSIS — D69.6 THROMBOCYTOPENIA: ICD-10-CM

## 2021-06-03 DIAGNOSIS — D64.9 ANEMIA, UNSPECIFIED TYPE: Primary | ICD-10-CM

## 2021-06-03 PROCEDURE — 99213 PR OFFICE/OUTPT VISIT, EST, LEVL III, 20-29 MIN: ICD-10-PCS | Mod: S$PBB,,, | Performed by: INTERNAL MEDICINE

## 2021-06-03 PROCEDURE — 99999 PR PBB SHADOW E&M-EST. PATIENT-LVL IV: ICD-10-PCS | Mod: PBBFAC,,, | Performed by: INTERNAL MEDICINE

## 2021-06-03 PROCEDURE — 99999 PR PBB SHADOW E&M-EST. PATIENT-LVL IV: CPT | Mod: PBBFAC,,, | Performed by: INTERNAL MEDICINE

## 2021-06-03 PROCEDURE — 99214 OFFICE O/P EST MOD 30 MIN: CPT | Mod: PBBFAC | Performed by: INTERNAL MEDICINE

## 2021-06-03 PROCEDURE — 99213 OFFICE O/P EST LOW 20 MIN: CPT | Mod: S$PBB,,, | Performed by: INTERNAL MEDICINE

## 2021-06-06 PROBLEM — D69.6 THROMBOCYTOPENIA: Status: ACTIVE | Noted: 2021-06-06

## 2021-12-03 ENCOUNTER — LAB VISIT (OUTPATIENT)
Dept: LAB | Facility: HOSPITAL | Age: 51
End: 2021-12-03
Attending: INTERNAL MEDICINE
Payer: MEDICARE

## 2021-12-03 DIAGNOSIS — D64.9 ANEMIA, UNSPECIFIED TYPE: ICD-10-CM

## 2021-12-03 LAB
BASOPHILS # BLD AUTO: 0.02 K/UL (ref 0–0.2)
BASOPHILS NFR BLD: 0.4 % (ref 0–1.9)
DIFFERENTIAL METHOD: ABNORMAL
EOSINOPHIL # BLD AUTO: 0.1 K/UL (ref 0–0.5)
EOSINOPHIL NFR BLD: 1.4 % (ref 0–8)
ERYTHROCYTE [DISTWIDTH] IN BLOOD BY AUTOMATED COUNT: 12 % (ref 11.5–14.5)
FERRITIN SERPL-MCNC: 99 NG/ML (ref 20–300)
HCT VFR BLD AUTO: 37.5 % (ref 40–54)
HGB BLD-MCNC: 12.2 G/DL (ref 14–18)
IMM GRANULOCYTES # BLD AUTO: 0.02 K/UL (ref 0–0.04)
IMM GRANULOCYTES NFR BLD AUTO: 0.4 % (ref 0–0.5)
IRON SERPL-MCNC: 42 UG/DL (ref 45–160)
LYMPHOCYTES # BLD AUTO: 0.3 K/UL (ref 1–4.8)
LYMPHOCYTES NFR BLD: 5.2 % (ref 18–48)
MCH RBC QN AUTO: 33.4 PG (ref 27–31)
MCHC RBC AUTO-ENTMCNC: 32.5 G/DL (ref 32–36)
MCV RBC AUTO: 103 FL (ref 82–98)
MONOCYTES # BLD AUTO: 0.2 K/UL (ref 0.3–1)
MONOCYTES NFR BLD: 3.8 % (ref 4–15)
NEUTROPHILS # BLD AUTO: 4.4 K/UL (ref 1.8–7.7)
NEUTROPHILS NFR BLD: 88.8 % (ref 38–73)
NRBC BLD-RTO: 0 /100 WBC
PLATELET # BLD AUTO: 129 K/UL (ref 150–450)
PMV BLD AUTO: 11.8 FL (ref 9.2–12.9)
RBC # BLD AUTO: 3.65 M/UL (ref 4.6–6.2)
SATURATED IRON: 14 % (ref 20–50)
TOTAL IRON BINDING CAPACITY: 309 UG/DL (ref 250–450)
TRANSFERRIN SERPL-MCNC: 209 MG/DL (ref 200–375)
WBC # BLD AUTO: 5 K/UL (ref 3.9–12.7)

## 2021-12-03 PROCEDURE — 85025 COMPLETE CBC W/AUTO DIFF WBC: CPT | Performed by: INTERNAL MEDICINE

## 2021-12-03 PROCEDURE — 84466 ASSAY OF TRANSFERRIN: CPT | Performed by: INTERNAL MEDICINE

## 2021-12-03 PROCEDURE — 36415 COLL VENOUS BLD VENIPUNCTURE: CPT | Performed by: INTERNAL MEDICINE

## 2021-12-03 PROCEDURE — 82728 ASSAY OF FERRITIN: CPT | Performed by: INTERNAL MEDICINE

## 2021-12-13 ENCOUNTER — OFFICE VISIT (OUTPATIENT)
Dept: HEMATOLOGY/ONCOLOGY | Facility: CLINIC | Age: 51
End: 2021-12-13
Payer: MEDICARE

## 2021-12-13 VITALS
OXYGEN SATURATION: 97 % | TEMPERATURE: 98 F | HEART RATE: 82 BPM | SYSTOLIC BLOOD PRESSURE: 116 MMHG | WEIGHT: 91.5 LBS | HEIGHT: 63 IN | DIASTOLIC BLOOD PRESSURE: 62 MMHG | BODY MASS INDEX: 16.21 KG/M2

## 2021-12-13 DIAGNOSIS — D69.6 THROMBOCYTOPENIA: ICD-10-CM

## 2021-12-13 DIAGNOSIS — D64.9 ANEMIA, UNSPECIFIED TYPE: Primary | ICD-10-CM

## 2021-12-13 DIAGNOSIS — D50.9 IRON DEFICIENCY ANEMIA, UNSPECIFIED IRON DEFICIENCY ANEMIA TYPE: ICD-10-CM

## 2021-12-13 DIAGNOSIS — N18.9 CHRONIC KIDNEY DISEASE, UNSPECIFIED CKD STAGE: ICD-10-CM

## 2021-12-13 PROCEDURE — 99214 PR OFFICE/OUTPT VISIT, EST, LEVL IV, 30-39 MIN: ICD-10-PCS | Mod: S$PBB,,, | Performed by: INTERNAL MEDICINE

## 2021-12-13 PROCEDURE — 99213 OFFICE O/P EST LOW 20 MIN: CPT | Mod: PBBFAC | Performed by: INTERNAL MEDICINE

## 2021-12-13 PROCEDURE — 99999 PR PBB SHADOW E&M-EST. PATIENT-LVL III: ICD-10-PCS | Mod: PBBFAC,,, | Performed by: INTERNAL MEDICINE

## 2021-12-13 PROCEDURE — 99999 PR PBB SHADOW E&M-EST. PATIENT-LVL III: CPT | Mod: PBBFAC,,, | Performed by: INTERNAL MEDICINE

## 2021-12-13 PROCEDURE — 99214 OFFICE O/P EST MOD 30 MIN: CPT | Mod: S$PBB,,, | Performed by: INTERNAL MEDICINE

## 2022-03-14 ENCOUNTER — TELEPHONE (OUTPATIENT)
Dept: HEMATOLOGY/ONCOLOGY | Facility: CLINIC | Age: 52
End: 2022-03-14
Payer: MEDICARE

## 2022-03-14 DIAGNOSIS — D50.9 IRON DEFICIENCY ANEMIA, UNSPECIFIED IRON DEFICIENCY ANEMIA TYPE: Primary | ICD-10-CM

## 2022-03-14 DIAGNOSIS — D69.6 THROMBOCYTOPENIA: ICD-10-CM

## 2022-03-14 NOTE — TELEPHONE ENCOUNTER
Rec'd incoming call from mother of patient. Rec'd recall letter to schedule May 2022 clinic and lab appointment. Mother of patient agreed to scheduled appointments. Mailed reminders.

## 2022-05-19 ENCOUNTER — LAB VISIT (OUTPATIENT)
Dept: LAB | Facility: HOSPITAL | Age: 52
End: 2022-05-19
Attending: INTERNAL MEDICINE
Payer: MEDICARE

## 2022-05-19 DIAGNOSIS — D50.9 IRON DEFICIENCY ANEMIA, UNSPECIFIED IRON DEFICIENCY ANEMIA TYPE: ICD-10-CM

## 2022-05-19 DIAGNOSIS — D69.6 THROMBOCYTOPENIA: ICD-10-CM

## 2022-05-19 LAB
ALBUMIN SERPL BCP-MCNC: 3.5 G/DL (ref 3.5–5.2)
ALP SERPL-CCNC: 59 U/L (ref 55–135)
ALT SERPL W/O P-5'-P-CCNC: 16 U/L (ref 10–44)
ANION GAP SERPL CALC-SCNC: 6 MMOL/L (ref 8–16)
AST SERPL-CCNC: 18 U/L (ref 10–40)
BASOPHILS # BLD AUTO: 0.03 K/UL (ref 0–0.2)
BASOPHILS NFR BLD: 0.7 % (ref 0–1.9)
BILIRUB SERPL-MCNC: 0.5 MG/DL (ref 0.1–1)
BUN SERPL-MCNC: 20 MG/DL (ref 6–20)
CALCIUM SERPL-MCNC: 8.9 MG/DL (ref 8.7–10.5)
CHLORIDE SERPL-SCNC: 101 MMOL/L (ref 95–110)
CO2 SERPL-SCNC: 25 MMOL/L (ref 23–29)
CREAT SERPL-MCNC: 1.7 MG/DL (ref 0.5–1.4)
DIFFERENTIAL METHOD: ABNORMAL
EOSINOPHIL # BLD AUTO: 0.2 K/UL (ref 0–0.5)
EOSINOPHIL NFR BLD: 3.9 % (ref 0–8)
ERYTHROCYTE [DISTWIDTH] IN BLOOD BY AUTOMATED COUNT: 12.2 % (ref 11.5–14.5)
EST. GFR  (AFRICAN AMERICAN): 52 ML/MIN/1.73 M^2
EST. GFR  (NON AFRICAN AMERICAN): 45 ML/MIN/1.73 M^2
FERRITIN SERPL-MCNC: 44 NG/ML (ref 20–300)
GLUCOSE SERPL-MCNC: 137 MG/DL (ref 70–110)
HCT VFR BLD AUTO: 33.7 % (ref 40–54)
HGB BLD-MCNC: 11 G/DL (ref 14–18)
IMM GRANULOCYTES # BLD AUTO: 0.01 K/UL (ref 0–0.04)
IMM GRANULOCYTES NFR BLD AUTO: 0.2 % (ref 0–0.5)
IRON SERPL-MCNC: 62 UG/DL (ref 45–160)
IRON SERPL-MCNC: 62 UG/DL (ref 45–160)
LYMPHOCYTES # BLD AUTO: 1 K/UL (ref 1–4.8)
LYMPHOCYTES NFR BLD: 24.5 % (ref 18–48)
MCH RBC QN AUTO: 34.1 PG (ref 27–31)
MCHC RBC AUTO-ENTMCNC: 32.6 G/DL (ref 32–36)
MCV RBC AUTO: 104 FL (ref 82–98)
MONOCYTES # BLD AUTO: 0.5 K/UL (ref 0.3–1)
MONOCYTES NFR BLD: 11.8 % (ref 4–15)
NEUTROPHILS # BLD AUTO: 2.4 K/UL (ref 1.8–7.7)
NEUTROPHILS NFR BLD: 58.9 % (ref 38–73)
NRBC BLD-RTO: 0 /100 WBC
PLATELET # BLD AUTO: 150 K/UL (ref 150–450)
PMV BLD AUTO: 11.3 FL (ref 9.2–12.9)
POTASSIUM SERPL-SCNC: 3.8 MMOL/L (ref 3.5–5.1)
PROT SERPL-MCNC: 6.4 G/DL (ref 6–8.4)
RBC # BLD AUTO: 3.23 M/UL (ref 4.6–6.2)
SATURATED IRON: 20 % (ref 20–50)
SODIUM SERPL-SCNC: 132 MMOL/L (ref 136–145)
TOTAL IRON BINDING CAPACITY: 305 UG/DL (ref 250–450)
TRANSFERRIN SERPL-MCNC: 206 MG/DL (ref 200–375)
WBC # BLD AUTO: 4.08 K/UL (ref 3.9–12.7)

## 2022-05-19 PROCEDURE — 84466 ASSAY OF TRANSFERRIN: CPT | Performed by: INTERNAL MEDICINE

## 2022-05-19 PROCEDURE — 85025 COMPLETE CBC W/AUTO DIFF WBC: CPT | Performed by: INTERNAL MEDICINE

## 2022-05-19 PROCEDURE — 80053 COMPREHEN METABOLIC PANEL: CPT | Performed by: INTERNAL MEDICINE

## 2022-05-19 PROCEDURE — 82728 ASSAY OF FERRITIN: CPT | Performed by: INTERNAL MEDICINE

## 2022-05-19 PROCEDURE — 36415 COLL VENOUS BLD VENIPUNCTURE: CPT | Performed by: INTERNAL MEDICINE

## 2022-05-24 ENCOUNTER — OFFICE VISIT (OUTPATIENT)
Dept: HEMATOLOGY/ONCOLOGY | Facility: CLINIC | Age: 52
End: 2022-05-24
Payer: MEDICARE

## 2022-05-24 VITALS
SYSTOLIC BLOOD PRESSURE: 122 MMHG | BODY MASS INDEX: 16.02 KG/M2 | DIASTOLIC BLOOD PRESSURE: 80 MMHG | TEMPERATURE: 98 F | WEIGHT: 90.38 LBS | HEIGHT: 63 IN

## 2022-05-24 DIAGNOSIS — N18.9 CHRONIC KIDNEY DISEASE, UNSPECIFIED CKD STAGE: ICD-10-CM

## 2022-05-24 DIAGNOSIS — D64.9 ANEMIA, UNSPECIFIED TYPE: Primary | ICD-10-CM

## 2022-05-24 PROCEDURE — 99999 PR PBB SHADOW E&M-EST. PATIENT-LVL III: ICD-10-PCS | Mod: PBBFAC,,, | Performed by: INTERNAL MEDICINE

## 2022-05-24 PROCEDURE — 99214 PR OFFICE/OUTPT VISIT, EST, LEVL IV, 30-39 MIN: ICD-10-PCS | Mod: S$PBB,,, | Performed by: INTERNAL MEDICINE

## 2022-05-24 PROCEDURE — 99999 PR PBB SHADOW E&M-EST. PATIENT-LVL III: CPT | Mod: PBBFAC,,, | Performed by: INTERNAL MEDICINE

## 2022-05-24 PROCEDURE — 99214 OFFICE O/P EST MOD 30 MIN: CPT | Mod: S$PBB,,, | Performed by: INTERNAL MEDICINE

## 2022-05-24 PROCEDURE — 99213 OFFICE O/P EST LOW 20 MIN: CPT | Mod: PBBFAC | Performed by: INTERNAL MEDICINE

## 2022-05-24 NOTE — PROGRESS NOTES
Subjective:       Patient ID: Jarrett Odonnell Jr. is a 51 y.o. male.    Chief Complaint: Anemia       Diagnosis: KELI      51-year-old gentleman with past medical history of type 2 diabetes mellitus, CK D3, hypertension seen today for f/u for KELI  He is accompanied by his mother.    No history of blood transfusions.   Appetite and weight stable.  No fevers or night sweats.  CBC from 8/24/2017 revealed a white blood cell count of 4100/MM 3 hemoglobin of 8.9 g/dL hematocrit of 28.6% MCV of 82.4 and a platelet count of 199K . S/p Injectafer completed 9/2017    Interval Hx; Accompanied by mother  He f/b Cardiology for w/u for dizziness  He has Holter monitor   Appetite and weight stable  No fatigue  No SOB/CP  No melena, hematochezia or change in bowel habits      He is followed at outside facility for adrenal lesion  Recent imaging outside facility reveals probable adrenal adenoma        hemoglobin of  11g/dL       Pt s/p GI eval 10/2017  EGD- nl esophagus, gastric polyp  Colonoscopy -[facility], o/w unremarkable        PAST MEDICAL HISTORY type 2 diabetes mellitus with diabetic chronic kidney disease, hypertensive chronic kidney disease, chronic kidney disease stage III      PAST SURGICAL HISTORY : Orchiectomy     SOCIAL HISTORY : No  history of tobacco use.  No history of ETOH use.  He lives with his parents.  He attends [employer].  No smoke or alcohol use      Review of Systems   Constitutional: Negative for appetite change, fatigue, fever and unexpected weight change.   HENT: Negative for mouth sores.    Eyes: Negative for visual disturbance.   Respiratory: Negative for cough and shortness of breath.    Cardiovascular: Negative for chest pain.   Gastrointestinal: Negative for abdominal pain and diarrhea.   Genitourinary: Negative for frequency.   Musculoskeletal: Negative for back pain.   Skin: Negative for rash.   Neurological: Negative for headaches.   Hematological: Negative for adenopathy.  "  Psychiatric/Behavioral: The patient is not nervous/anxious.        Objective:     Vitals:    05/24/22 1522   BP: 122/80   BP Location: Left arm   Patient Position: Sitting   BP Method: Large (Automatic)   Temp: 97.6 °F (36.4 °C)   TempSrc: Oral   Weight: 41 kg (90 lb 6.2 oz)   Height: 5' 3" (1.6 m)         Physical Exam  Constitutional:       Appearance: He is well-developed.   HENT:      Head: Normocephalic.   Eyes:      General: No scleral icterus.     Conjunctiva/sclera: Conjunctivae normal.   Cardiovascular:      Rate and Rhythm: Normal rate and regular rhythm.      Heart sounds: Normal heart sounds. No murmur heard.  Pulmonary:      Effort: Pulmonary effort is normal.      Breath sounds: Normal breath sounds. No wheezing or rales.   Chest:   Breasts:      Right: No supraclavicular adenopathy.      Left: No supraclavicular adenopathy.       Abdominal:      General: Bowel sounds are normal. There is no distension.      Palpations: Abdomen is soft. There is no mass.      Tenderness: There is no abdominal tenderness. There is no guarding or rebound.   Musculoskeletal:         General: Normal range of motion.      Cervical back: Normal range of motion and neck supple.   Lymphadenopathy:      Upper Body:      Right upper body: No supraclavicular adenopathy.      Left upper body: No supraclavicular adenopathy.   Skin:     Findings: No erythema or rash.   Neurological:      Mental Status: He is alert and oriented to person, place, and time.      Cranial Nerves: No cranial nerve deficit.         Outside Facility labs Reviewed.  8/24/2017 BUN 17 Creatinine 1.27 Sodium 140 Potassium 3.7 Chloride 106 Calcium 8.7 Phosphorous 2.3 Hemoglobin 8.9 Hematocrit 28.6% plt ct t 190K from   3/7/20/17 Hemoglobin 10.3 g/dL  Hematocrit 31.7%    SPEP-nl    Results for JOHN MCGRATH JR. (MRN [id number]) as of 9/15/2017 10:06   Ref. Range 8/28/2017 12:00   [place] Free Light Chains Latest Ref Range: 0.33 - 1.94 mg/dL 4.70 (H)   Lambda Free " Light Chains Latest Ref Range: 0.57 - 2.63 mg/dL 3.35 (H)   Kappa/Lambda FLC Ratio Latest Ref Range: 0.26 - 1.65  1.40     Lab Results   Component Value Date    IRON 62 05/19/2022    IRON 62 05/19/2022    TIBC 305 05/19/2022    FERRITIN 44 05/19/2022     Results for JOHN MCGRATH JR. (MRN [id number]) as of 9/15/2017 10:06   Ref. Range 8/28/2017 12:00   Copper Latest Ref Range: 665 - 1480 ug/L 1239   Differential Method Unknown Automated   RBC Folate Latest Ref Range: 280 - 791 ng/mL 696   Zinc, Serum-ALT Latest Ref Range: 60 - 130 ug/dL 62     Lab Results   Component Value Date    WBC 4.08 05/19/2022    HGB 11.0 (L) 05/19/2022    HCT 33.7 (L) 05/19/2022     (H) 05/19/2022     05/19/2022     Lab Results   Component Value Date    IRON 62 05/19/2022    IRON 62 05/19/2022    TIBC 305 05/19/2022    FERRITIN 44 05/19/2022     Results for TRESSA MCGRATH JR. (MRN [id number]) as of 9/26/2019 13:55   Ref. Range 9/9/2019 15:10   Protein, Serum Latest Ref Range: 6.0 - 8.4 g/dL 6.4   RBC Folate Latest Ref Range: 280 - 791 ng/mL 697   Zinc, Serum-ALT Latest Ref Range: 60 - 130 ug/dL 46 (L)       Results for TRESSA MCGRATH JR. (MRN [id number]) as of 1/6/2020 12:46   Ref. Range 9/9/2019 15:10 12/30/2019 15:53   Zinc, Serum-ALT Latest Ref Range: 60 - 130 ug/dL 46 (L) 45 (L)       5/2019 Outside Facility [facility]  Indication: Dilatation of abdominal aorta.    Prior studies reviewed: None available.  Procedure: One or more of the following dose lowering techniques were utilized: Automated exposure control, iterative reconstruction technique, and/or adjustment of the mA and kV according to patient size.   Contiguous 1 mm images were obtained through the abdominal aorta and the pelvic vessels down through the bifurcation of the common femoral arteries. Additional 1 mm images were obtained after 100 ml intravenous contrast. The raw data was then sent to a separate post processing workstation where 3-D MIP images were obtained and  vessel analysis was performed.    Findings: The abdominal aorta is normal in caliber. There is no significant atherosclerotic disease. No stenosis or aneurysm is seen.    The celiac, SMA and KAREN are patent. The renal arteries are also patent.    In the pelvis, the iliac vessels and the common femoral artery are patent.    Other CT findings include a 1.5 cm rounded lesion in the right adrenal gland, most likely an adenoma. In the pelvis, there was a small amount of free fluid.    IMPRESSION:   1. No significant vascular disease is seen in the abdomen or pelvis. There is no atherosclerosis or aneurysm.  2. Probable adenoma in the right adrenal gland.  3. There is a small amount of free fluid in the pelvis, of uncertain etiology.               Assessment:       1. Anemia, unspecified type    2. Chronic kidney disease, unspecified CKD stage        Plan:   1.,2.  51 -year-old with history type 2 diabetes mellitus with diabetic chronic kidney disease,Chronic kidney disease stage III   Pt has a mild anemia dating back to at least 2016   CKD likely contributing factor  Has hx of iron def -recent Fe parameters wnl   Hb 11  g/dL   Fe paramters OK  S/p GI eval ( Dr. Rodriguez) - no bleeding source  Hx of zinc def  Pt taking zinc   Plan repeat testing  Previously discussed bmbx- pt elects to hold off      CBC, Fe studies, Zinc, MMA, SPEP, ISHAN. FLC prior to f/u 4mos      4.Cr level 1.8 to 1.6mg/dL   Recent Cr level 1.7mg/dL   Follow-up with Nephrology     Follow up with PCP for med mgmt      Follow-up in 4 mos with cbc,Fe studies,MMA, ZINC, SPEP, ISHAN, Free light chains 1 week   prior to f/u     Cc Emil Montejo M.D.        Alpesh Rodriguez II, MD

## 2022-09-19 ENCOUNTER — LAB VISIT (OUTPATIENT)
Dept: LAB | Facility: HOSPITAL | Age: 52
End: 2022-09-19
Attending: INTERNAL MEDICINE
Payer: MEDICARE

## 2022-09-19 DIAGNOSIS — D64.9 ANEMIA, UNSPECIFIED TYPE: ICD-10-CM

## 2022-09-19 LAB
BASOPHILS # BLD AUTO: 0.05 K/UL (ref 0–0.2)
BASOPHILS # BLD AUTO: 0.05 K/UL (ref 0–0.2)
BASOPHILS NFR BLD: 0.8 % (ref 0–1.9)
BASOPHILS NFR BLD: 0.8 % (ref 0–1.9)
DIFFERENTIAL METHOD: ABNORMAL
DIFFERENTIAL METHOD: ABNORMAL
EOSINOPHIL # BLD AUTO: 0.3 K/UL (ref 0–0.5)
EOSINOPHIL # BLD AUTO: 0.3 K/UL (ref 0–0.5)
EOSINOPHIL NFR BLD: 4.4 % (ref 0–8)
EOSINOPHIL NFR BLD: 4.4 % (ref 0–8)
ERYTHROCYTE [DISTWIDTH] IN BLOOD BY AUTOMATED COUNT: 11.9 % (ref 11.5–14.5)
ERYTHROCYTE [DISTWIDTH] IN BLOOD BY AUTOMATED COUNT: 11.9 % (ref 11.5–14.5)
FERRITIN SERPL-MCNC: 62 NG/ML (ref 20–300)
HCT VFR BLD AUTO: 32.4 % (ref 40–54)
HCT VFR BLD AUTO: 32.4 % (ref 40–54)
HGB BLD-MCNC: 11 G/DL (ref 14–18)
HGB BLD-MCNC: 11 G/DL (ref 14–18)
IMM GRANULOCYTES # BLD AUTO: 0.01 K/UL (ref 0–0.04)
IMM GRANULOCYTES # BLD AUTO: 0.01 K/UL (ref 0–0.04)
IMM GRANULOCYTES NFR BLD AUTO: 0.2 % (ref 0–0.5)
IMM GRANULOCYTES NFR BLD AUTO: 0.2 % (ref 0–0.5)
IRON SERPL-MCNC: 83 UG/DL (ref 45–160)
IRON SERPL-MCNC: 83 UG/DL (ref 45–160)
LYMPHOCYTES # BLD AUTO: 1.2 K/UL (ref 1–4.8)
LYMPHOCYTES # BLD AUTO: 1.2 K/UL (ref 1–4.8)
LYMPHOCYTES NFR BLD: 19.1 % (ref 18–48)
LYMPHOCYTES NFR BLD: 19.1 % (ref 18–48)
MCH RBC QN AUTO: 33.7 PG (ref 27–31)
MCH RBC QN AUTO: 33.7 PG (ref 27–31)
MCHC RBC AUTO-ENTMCNC: 34 G/DL (ref 32–36)
MCHC RBC AUTO-ENTMCNC: 34 G/DL (ref 32–36)
MCV RBC AUTO: 99 FL (ref 82–98)
MCV RBC AUTO: 99 FL (ref 82–98)
MONOCYTES # BLD AUTO: 0.6 K/UL (ref 0.3–1)
MONOCYTES # BLD AUTO: 0.6 K/UL (ref 0.3–1)
MONOCYTES NFR BLD: 9.6 % (ref 4–15)
MONOCYTES NFR BLD: 9.6 % (ref 4–15)
NEUTROPHILS # BLD AUTO: 4 K/UL (ref 1.8–7.7)
NEUTROPHILS # BLD AUTO: 4 K/UL (ref 1.8–7.7)
NEUTROPHILS NFR BLD: 65.9 % (ref 38–73)
NEUTROPHILS NFR BLD: 65.9 % (ref 38–73)
NRBC BLD-RTO: 0 /100 WBC
NRBC BLD-RTO: 0 /100 WBC
PLATELET # BLD AUTO: 152 K/UL (ref 150–450)
PLATELET # BLD AUTO: 152 K/UL (ref 150–450)
PMV BLD AUTO: 11.3 FL (ref 9.2–12.9)
PMV BLD AUTO: 11.3 FL (ref 9.2–12.9)
RBC # BLD AUTO: 3.26 M/UL (ref 4.6–6.2)
RBC # BLD AUTO: 3.26 M/UL (ref 4.6–6.2)
SATURATED IRON: 27 % (ref 20–50)
SATURATED IRON: 27 % (ref 20–50)
TOTAL IRON BINDING CAPACITY: 312 UG/DL (ref 250–450)
TOTAL IRON BINDING CAPACITY: 312 UG/DL (ref 250–450)
TRANSFERRIN SERPL-MCNC: 211 MG/DL (ref 200–375)
TRANSFERRIN SERPL-MCNC: 211 MG/DL (ref 200–375)
WBC # BLD AUTO: 6.12 K/UL (ref 3.9–12.7)
WBC # BLD AUTO: 6.12 K/UL (ref 3.9–12.7)

## 2022-09-19 PROCEDURE — 84630 ASSAY OF ZINC: CPT | Performed by: INTERNAL MEDICINE

## 2022-09-19 PROCEDURE — 82728 ASSAY OF FERRITIN: CPT | Performed by: INTERNAL MEDICINE

## 2022-09-19 PROCEDURE — 86334 IMMUNOFIX E-PHORESIS SERUM: CPT | Performed by: INTERNAL MEDICINE

## 2022-09-19 PROCEDURE — 86334 PATHOLOGIST INTERPRETATION IFE: ICD-10-PCS | Mod: 26,,, | Performed by: PATHOLOGY

## 2022-09-19 PROCEDURE — 84165 PATHOLOGIST INTERPRETATION SPE: ICD-10-PCS | Mod: 26,,, | Performed by: PATHOLOGY

## 2022-09-19 PROCEDURE — 86334 IMMUNOFIX E-PHORESIS SERUM: CPT | Mod: 26,,, | Performed by: PATHOLOGY

## 2022-09-19 PROCEDURE — 84466 ASSAY OF TRANSFERRIN: CPT | Performed by: INTERNAL MEDICINE

## 2022-09-19 PROCEDURE — 83520 IMMUNOASSAY QUANT NOS NONAB: CPT | Performed by: INTERNAL MEDICINE

## 2022-09-19 PROCEDURE — 84165 PROTEIN E-PHORESIS SERUM: CPT | Mod: 26,,, | Performed by: PATHOLOGY

## 2022-09-19 PROCEDURE — 85025 COMPLETE CBC W/AUTO DIFF WBC: CPT | Performed by: INTERNAL MEDICINE

## 2022-09-19 PROCEDURE — 83921 ORGANIC ACID SINGLE QUANT: CPT | Performed by: INTERNAL MEDICINE

## 2022-09-19 PROCEDURE — 84165 PROTEIN E-PHORESIS SERUM: CPT | Performed by: INTERNAL MEDICINE

## 2022-09-21 LAB
ALBUMIN SERPL ELPH-MCNC: 3.98 G/DL (ref 3.35–5.55)
ALPHA1 GLOB SERPL ELPH-MCNC: 0.3 G/DL (ref 0.17–0.41)
ALPHA2 GLOB SERPL ELPH-MCNC: 0.65 G/DL (ref 0.43–0.99)
B-GLOBULIN SERPL ELPH-MCNC: 0.66 G/DL (ref 0.5–1.1)
GAMMA GLOB SERPL ELPH-MCNC: 1.2 G/DL (ref 0.67–1.58)
INTERPRETATION SERPL IFE-IMP: NORMAL
KAPPA LC SER QL IA: 6.73 MG/DL (ref 0.33–1.94)
KAPPA LC/LAMBDA SER IA: 1.76 (ref 0.26–1.65)
LAMBDA LC SER QL IA: 3.83 MG/DL (ref 0.57–2.63)
PATHOLOGIST INTERPRETATION IFE: NORMAL
PATHOLOGIST INTERPRETATION SPE: NORMAL
PROT SERPL-MCNC: 6.8 G/DL (ref 6–8.4)

## 2022-09-22 LAB — ZINC SERPL-MCNC: 47 UG/DL (ref 60–130)

## 2022-09-23 LAB — METHYLMALONATE SERPL-SCNC: 0.61 UMOL/L

## 2022-09-26 ENCOUNTER — OFFICE VISIT (OUTPATIENT)
Dept: HEMATOLOGY/ONCOLOGY | Facility: CLINIC | Age: 52
End: 2022-09-26
Payer: MEDICARE

## 2022-09-26 VITALS
OXYGEN SATURATION: 97 % | WEIGHT: 89.94 LBS | SYSTOLIC BLOOD PRESSURE: 123 MMHG | TEMPERATURE: 98 F | BODY MASS INDEX: 15.94 KG/M2 | DIASTOLIC BLOOD PRESSURE: 70 MMHG | HEART RATE: 69 BPM | HEIGHT: 63 IN

## 2022-09-26 DIAGNOSIS — D64.9 ANEMIA, UNSPECIFIED TYPE: Primary | ICD-10-CM

## 2022-09-26 DIAGNOSIS — E60 ZINC DEFICIENCY: ICD-10-CM

## 2022-09-26 DIAGNOSIS — D50.9 IRON DEFICIENCY ANEMIA, UNSPECIFIED IRON DEFICIENCY ANEMIA TYPE: ICD-10-CM

## 2022-09-26 DIAGNOSIS — N18.9 CHRONIC KIDNEY DISEASE, UNSPECIFIED CKD STAGE: ICD-10-CM

## 2022-09-26 DIAGNOSIS — D51.8 OTHER VITAMIN B12 DEFICIENCY ANEMIAS: ICD-10-CM

## 2022-09-26 PROCEDURE — 99999 PR PBB SHADOW E&M-EST. PATIENT-LVL IV: ICD-10-PCS | Mod: PBBFAC,,, | Performed by: INTERNAL MEDICINE

## 2022-09-26 PROCEDURE — 99214 PR OFFICE/OUTPT VISIT, EST, LEVL IV, 30-39 MIN: ICD-10-PCS | Mod: S$PBB,,, | Performed by: INTERNAL MEDICINE

## 2022-09-26 PROCEDURE — 99214 OFFICE O/P EST MOD 30 MIN: CPT | Mod: S$PBB,,, | Performed by: INTERNAL MEDICINE

## 2022-09-26 PROCEDURE — 99214 OFFICE O/P EST MOD 30 MIN: CPT | Mod: PBBFAC | Performed by: INTERNAL MEDICINE

## 2022-09-26 PROCEDURE — 99999 PR PBB SHADOW E&M-EST. PATIENT-LVL IV: CPT | Mod: PBBFAC,,, | Performed by: INTERNAL MEDICINE

## 2022-09-26 NOTE — PROGRESS NOTES
Subjective:       Patient ID: Jarrett Odonnell Jr. is a 52 y.o. male.    Chief Complaint: Anemia       Diagnosis: KELI      52-year-old gentleman with past medical history of type 2 diabetes mellitus, CK D3, hypertension seen today for f/u for KELI  He is accompanied by his mother.    No history of blood transfusions.   Appetite and weight stable.  No fevers or night sweats.  CBC from 8/24/2017 revealed a white blood cell count of 4100/MM 3 hemoglobin of 8.9 g/dL hematocrit of 28.6% MCV of 82.4 and a platelet count of 199K . S/p Injectafer completed 9/2017    Interval Hx; Accompanied by mother  No new issues  No fatigue  Appetite and weight stable  No fatigue  No SOB/CP  No melena, hematochezia or change in bowel habits      He is followed at outside facility for adrenal lesion  Recent imaging outside facility reveals probable adrenal adenoma        hemoglobin of  11.0g/dL       Pt s/p GI eval 10/2017  EGD- nl esophagus, gastric polyp  Colonoscopy -[facility], o/w unremarkable        PAST MEDICAL HISTORY type 2 diabetes mellitus with diabetic chronic kidney disease, hypertensive chronic kidney disease, chronic kidney disease stage III      PAST SURGICAL HISTORY : Orchiectomy     SOCIAL HISTORY : No  history of tobacco use.  No history of ETOH use.  He lives with his parents.  He attends [employer] school.  No smoke or alcohol use      Review of Systems   Constitutional:  Negative for appetite change, fatigue, fever and unexpected weight change.   HENT:  Negative for mouth sores.    Eyes:  Negative for visual disturbance.   Respiratory:  Negative for cough and shortness of breath.    Cardiovascular:  Negative for chest pain.   Gastrointestinal:  Negative for abdominal pain and diarrhea.   Genitourinary:  Negative for frequency.   Musculoskeletal:  Negative for back pain.   Skin:  Negative for rash.   Neurological:  Negative for headaches.   Hematological:  Negative for adenopathy.   Psychiatric/Behavioral:  The patient is  "not nervous/anxious.      Objective:         Vitals:    09/26/22 1531   BP: 123/70   BP Location: Left arm   Patient Position: Sitting   BP Method: Large (Automatic)   Pulse: 69   Temp: 98.1 °F (36.7 °C)   TempSrc: Oral   SpO2: 97%   Weight: 40.8 kg (89 lb 15.2 oz)   Height: 5' 3" (1.6 m)         Physical Exam  Constitutional:       Appearance: He is well-developed.   HENT:      Head: Normocephalic.   Eyes:      General: No scleral icterus.     Conjunctiva/sclera: Conjunctivae normal.   Cardiovascular:      Rate and Rhythm: Normal rate and regular rhythm.      Heart sounds: Normal heart sounds. No murmur heard.  Pulmonary:      Effort: Pulmonary effort is normal.      Breath sounds: Normal breath sounds. No wheezing or rales.   Abdominal:      General: Bowel sounds are normal. There is no distension.      Palpations: Abdomen is soft. There is no mass.      Tenderness: There is no abdominal tenderness. There is no guarding or rebound.   Musculoskeletal:         General: Normal range of motion.      Cervical back: Normal range of motion and neck supple.   Skin:     Findings: No erythema or rash.   Neurological:      Mental Status: He is alert and oriented to person, place, and time.      Cranial Nerves: No cranial nerve deficit.       Outside Facility labs Reviewed.  8/24/2017 BUN 17 Creatinine 1.27 Sodium 140 Potassium 3.7 Chloride 106 Calcium 8.7 Phosphorous 2.3 Hemoglobin 8.9 Hematocrit 28.6% plt ct t 190K from   3/7/20/17 Hemoglobin 10.3 g/dL  Hematocrit 31.7%    SPEP-nl    Results for JOHN MCGRATH JR. (MRN [id number]) as of 9/15/2017 10:06   Ref. Range 8/28/2017 12:00   [place] Free Light Chains Latest Ref Range: 0.33 - 1.94 mg/dL 4.70 (H)   [place] Free Light Chains Latest Ref Range: 0.57 - 2.63 mg/dL 3.35 (H)   Kappa/Lambda FLC Ratio Latest Ref Range: 0.26 - 1.65  1.40     Lab Results   Component Value Date    IRON 83 09/19/2022    IRON 83 09/19/2022    TIBC 312 09/19/2022    TIBC 312 09/19/2022    FERRITIN 62 " 09/19/2022     Results for JOHN MCGRATH JR. (MRN [id number]) as of 9/15/2017 10:06   Ref. Range 8/28/2017 12:00   Copper Latest Ref Range: 665 - 1480 ug/L 1239   Differential Method Unknown Automated   RBC Folate Latest Ref Range: 280 - 791 ng/mL 696   Zinc, Serum-ALT Latest Ref Range: 60 - 130 ug/dL 62     Lab Results   Component Value Date    WBC 6.12 09/19/2022    WBC 6.12 09/19/2022    HGB 11.0 (L) 09/19/2022    HGB 11.0 (L) 09/19/2022    HCT 32.4 (L) 09/19/2022    HCT 32.4 (L) 09/19/2022    MCV 99 (H) 09/19/2022    MCV 99 (H) 09/19/2022     09/19/2022     09/19/2022     Lab Results   Component Value Date    IRON 83 09/19/2022    IRON 83 09/19/2022    TIBC 312 09/19/2022    TIBC 312 09/19/2022    FERRITIN 62 09/19/2022     Results for TRESSA MCGRATH JR. (MRN [id number]) as of 9/26/2019 13:55   Ref. Range 9/9/2019 15:10   Protein, Serum Latest Ref Range: 6.0 - 8.4 g/dL 6.4   RBC Folate Latest Ref Range: 280 - 791 ng/mL 697   Zinc, Serum-ALT Latest Ref Range: 60 - 130 ug/dL 46 (L)       Results for TRESSA MCGRATH JR. (MRN [id number]) as of 1/6/2020 12:46   Ref. Range 9/9/2019 15:10 12/30/2019 15:53   Zinc, Serum-ALT Latest Ref Range: 60 - 130 ug/dL 46 (L) 45 (L)       5/2019 Outside Facility [facility]  Indication: Dilatation of abdominal aorta.    Prior studies reviewed: None available.  Procedure: One or more of the following dose lowering techniques were utilized: Automated exposure control, iterative reconstruction technique, and/or adjustment of the mA and kV according to patient size.   Contiguous 1 mm images were obtained through the abdominal aorta and the pelvic vessels down through the bifurcation of the common femoral arteries. Additional 1 mm images were obtained after 100 ml intravenous contrast. The raw data was then sent to a separate post processing workstation where 3-D MIP images were obtained and vessel analysis was performed.    Findings: The abdominal aorta is normal in caliber. There is  no significant atherosclerotic disease. No stenosis or aneurysm is seen.    The celiac, SMA and KAREN are patent. The renal arteries are also patent.    In the pelvis, the iliac vessels and the common femoral artery are patent.    Other CT findings include a 1.5 cm rounded lesion in the right adrenal gland, most likely an adenoma. In the pelvis, there was a small amount of free fluid.    IMPRESSION:   1. No significant vascular disease is seen in the abdomen or pelvis. There is no atherosclerosis or aneurysm.  2. Probable adenoma in the right adrenal gland.  3. There is a small amount of free fluid in the pelvis, of uncertain etiology.               Assessment:       1. Anemia, unspecified type    2. Iron deficiency anemia, unspecified iron deficiency anemia type    3. Chronic kidney disease, unspecified CKD stage    4. Zinc deficiency    5. Other vitamin B12 deficiency anemias          Plan:   1.,2.  51 -year-old with history type 2 diabetes mellitus with diabetic chronic kidney disease,Chronic kidney disease stage III   Pt has a mild anemia dating back to at least 2016   CKD likely contributing factor  Has hx of iron def -recent Fe parameters wnl   Hb 11  g/dL   Fe paramters OK  S/p GI eval ( Dr. Rodriguez) - no bleeding source    3. Restart Zinc supp    4. Recheck B12, MMA     CBC, Fe studies, Zinc, MMA, SPEP, ISHAN. FLC prior to f/u 4mos      5.Cr level 1.8 to 1.6mg/dL   Recent Cr level 1.7mg/dL   Follow-up with Nephrology     Follow up with PCP for med mgmt      Follow-up in 4 mos with cbc,Fe studies,cmp MMA, ZINC, SPEP, ISHAN 1 week   prior to f/u     Cc Emil Montejo M.D.        Alpesh Rodriguez II, MD

## 2022-11-30 ENCOUNTER — TELEPHONE (OUTPATIENT)
Dept: HEMATOLOGY/ONCOLOGY | Facility: CLINIC | Age: 52
End: 2022-11-30
Payer: MEDICARE

## 2022-12-01 ENCOUNTER — TELEPHONE (OUTPATIENT)
Dept: HEMATOLOGY/ONCOLOGY | Facility: CLINIC | Age: 52
End: 2022-12-01
Payer: MEDICARE

## 2022-12-01 NOTE — TELEPHONE ENCOUNTER
TC to pt's mom to advise her that pt is to continue on his B 12 and zinc until labs and visit in January is evaluated  At that time Dr paez will determine if medication can be discontinued or if pt is to contin ue in with it   message left on VM

## 2023-01-19 ENCOUNTER — LAB VISIT (OUTPATIENT)
Dept: LAB | Facility: HOSPITAL | Age: 53
End: 2023-01-19
Attending: INTERNAL MEDICINE
Payer: MEDICARE

## 2023-01-19 DIAGNOSIS — E60 ZINC DEFICIENCY: ICD-10-CM

## 2023-01-19 DIAGNOSIS — D51.8 OTHER VITAMIN B12 DEFICIENCY ANEMIAS: ICD-10-CM

## 2023-01-19 DIAGNOSIS — D64.9 ANEMIA, UNSPECIFIED TYPE: ICD-10-CM

## 2023-01-19 LAB
ALBUMIN SERPL BCP-MCNC: 3.6 G/DL (ref 3.5–5.2)
ALP SERPL-CCNC: 55 U/L (ref 55–135)
ALT SERPL W/O P-5'-P-CCNC: 14 U/L (ref 10–44)
ANION GAP SERPL CALC-SCNC: 5 MMOL/L (ref 8–16)
AST SERPL-CCNC: 18 U/L (ref 10–40)
BASOPHILS # BLD AUTO: 0.03 K/UL (ref 0–0.2)
BASOPHILS NFR BLD: 0.9 % (ref 0–1.9)
BILIRUB SERPL-MCNC: 0.6 MG/DL (ref 0.1–1)
BUN SERPL-MCNC: 29 MG/DL (ref 6–20)
CALCIUM SERPL-MCNC: 9.3 MG/DL (ref 8.7–10.5)
CHLORIDE SERPL-SCNC: 108 MMOL/L (ref 95–110)
CO2 SERPL-SCNC: 27 MMOL/L (ref 23–29)
CREAT SERPL-MCNC: 1.6 MG/DL (ref 0.5–1.4)
DIFFERENTIAL METHOD: ABNORMAL
EOSINOPHIL # BLD AUTO: 0.1 K/UL (ref 0–0.5)
EOSINOPHIL NFR BLD: 4 % (ref 0–8)
ERYTHROCYTE [DISTWIDTH] IN BLOOD BY AUTOMATED COUNT: 12.2 % (ref 11.5–14.5)
EST. GFR  (NO RACE VARIABLE): 51 ML/MIN/1.73 M^2
FERRITIN SERPL-MCNC: 42 NG/ML (ref 20–300)
GLUCOSE SERPL-MCNC: 116 MG/DL (ref 70–110)
HCT VFR BLD AUTO: 32.7 % (ref 40–54)
HGB BLD-MCNC: 10.8 G/DL (ref 14–18)
IMM GRANULOCYTES # BLD AUTO: 0.01 K/UL (ref 0–0.04)
IMM GRANULOCYTES NFR BLD AUTO: 0.3 % (ref 0–0.5)
IRON SERPL-MCNC: 99 UG/DL (ref 45–160)
LYMPHOCYTES # BLD AUTO: 0.7 K/UL (ref 1–4.8)
LYMPHOCYTES NFR BLD: 20.8 % (ref 18–48)
MCH RBC QN AUTO: 34 PG (ref 27–31)
MCHC RBC AUTO-ENTMCNC: 33 G/DL (ref 32–36)
MCV RBC AUTO: 103 FL (ref 82–98)
MONOCYTES # BLD AUTO: 0.3 K/UL (ref 0.3–1)
MONOCYTES NFR BLD: 10.4 % (ref 4–15)
NEUTROPHILS # BLD AUTO: 2.1 K/UL (ref 1.8–7.7)
NEUTROPHILS NFR BLD: 63.6 % (ref 38–73)
NRBC BLD-RTO: 0 /100 WBC
PLATELET # BLD AUTO: 148 K/UL (ref 150–450)
PMV BLD AUTO: 11.5 FL (ref 9.2–12.9)
POTASSIUM SERPL-SCNC: 4 MMOL/L (ref 3.5–5.1)
PROT SERPL-MCNC: 6.8 G/DL (ref 6–8.4)
RBC # BLD AUTO: 3.18 M/UL (ref 4.6–6.2)
SATURATED IRON: 32 % (ref 20–50)
SODIUM SERPL-SCNC: 140 MMOL/L (ref 136–145)
TOTAL IRON BINDING CAPACITY: 314 UG/DL (ref 250–450)
TRANSFERRIN SERPL-MCNC: 212 MG/DL (ref 200–375)
VIT B12 SERPL-MCNC: 1795 PG/ML (ref 210–950)
WBC # BLD AUTO: 3.27 K/UL (ref 3.9–12.7)

## 2023-01-19 PROCEDURE — 84466 ASSAY OF TRANSFERRIN: CPT | Performed by: INTERNAL MEDICINE

## 2023-01-19 PROCEDURE — 84630 ASSAY OF ZINC: CPT | Performed by: INTERNAL MEDICINE

## 2023-01-19 PROCEDURE — 82607 VITAMIN B-12: CPT | Performed by: INTERNAL MEDICINE

## 2023-01-19 PROCEDURE — 82728 ASSAY OF FERRITIN: CPT | Performed by: INTERNAL MEDICINE

## 2023-01-19 PROCEDURE — 83921 ORGANIC ACID SINGLE QUANT: CPT | Performed by: INTERNAL MEDICINE

## 2023-01-19 PROCEDURE — 85025 COMPLETE CBC W/AUTO DIFF WBC: CPT | Performed by: INTERNAL MEDICINE

## 2023-01-19 PROCEDURE — 80053 COMPREHEN METABOLIC PANEL: CPT | Performed by: INTERNAL MEDICINE

## 2023-01-23 LAB — ZINC SERPL-MCNC: 64 UG/DL (ref 60–130)

## 2023-01-24 LAB — METHYLMALONATE SERPL-SCNC: 0.25 UMOL/L

## 2023-01-26 ENCOUNTER — OFFICE VISIT (OUTPATIENT)
Dept: HEMATOLOGY/ONCOLOGY | Facility: CLINIC | Age: 53
End: 2023-01-26
Payer: MEDICARE

## 2023-01-26 VITALS
SYSTOLIC BLOOD PRESSURE: 119 MMHG | DIASTOLIC BLOOD PRESSURE: 77 MMHG | BODY MASS INDEX: 16.45 KG/M2 | OXYGEN SATURATION: 96 % | WEIGHT: 92.81 LBS | HEIGHT: 63 IN | HEART RATE: 82 BPM

## 2023-01-26 DIAGNOSIS — D50.9 IRON DEFICIENCY ANEMIA, UNSPECIFIED IRON DEFICIENCY ANEMIA TYPE: Primary | ICD-10-CM

## 2023-01-26 DIAGNOSIS — D69.6 THROMBOCYTOPENIA: ICD-10-CM

## 2023-01-26 DIAGNOSIS — E60 ZINC DEFICIENCY: ICD-10-CM

## 2023-01-26 PROCEDURE — 99999 PR PBB SHADOW E&M-EST. PATIENT-LVL II: ICD-10-PCS | Mod: PBBFAC,,, | Performed by: INTERNAL MEDICINE

## 2023-01-26 PROCEDURE — 99999 PR PBB SHADOW E&M-EST. PATIENT-LVL II: CPT | Mod: PBBFAC,,, | Performed by: INTERNAL MEDICINE

## 2023-01-26 PROCEDURE — 99212 OFFICE O/P EST SF 10 MIN: CPT | Mod: PBBFAC | Performed by: INTERNAL MEDICINE

## 2023-01-26 PROCEDURE — 99213 PR OFFICE/OUTPT VISIT, EST, LEVL III, 20-29 MIN: ICD-10-PCS | Mod: S$PBB,,, | Performed by: INTERNAL MEDICINE

## 2023-01-26 PROCEDURE — 99213 OFFICE O/P EST LOW 20 MIN: CPT | Mod: S$PBB,,, | Performed by: INTERNAL MEDICINE

## 2023-01-26 NOTE — PROGRESS NOTES
Subjective:       Patient ID: Jarrett Odonnell Jr. is a 52 y.o. male.    Chief Complaint: Anemia         Diagnosis: KELI      52-year-old gentleman with past medical history of type 2 diabetes mellitus, CK D3, hypertension seen today for f/u for KELI  He is accompanied by his mother.    No history of blood transfusions.   Appetite and weight stable.  No fevers or night sweats.  CBC from 8/24/2017 revealed a white blood cell count of 4100/MM 3 hemoglobin of 8.9 g/dL hematocrit of 28.6% MCV of 82.4 and a platelet count of 199K . S/p Injectafer completed 9/2017    Interval Hx; Accompanied by mother  No new issues  In good spirits  No fatigue  Appetite and weight stable  No fatigue  No SOB/CP  No melena, hematochezia or change in bowel habits      He is followed at outside facility for adrenal lesion  Recent imaging outside facility reveals probable adrenal adenoma        hemoglobin of  10.8g/dL       Pt s/p GI eval 10/2017  EGD- nl esophagus, gastric polyp  Colonoscopy -[facility], o/w unremarkable        PAST MEDICAL HISTORY type 2 diabetes mellitus with diabetic chronic kidney disease, hypertensive chronic kidney disease, chronic kidney disease stage III      PAST SURGICAL HISTORY : Orchiectomy     SOCIAL HISTORY : No  history of tobacco use.  No history of ETOH use.  He lives with his parents.  He attends [employer].  No smoke or alcohol use      Review of Systems   Constitutional:  Negative for appetite change, fatigue, fever and unexpected weight change.   HENT:  Negative for mouth sores.    Eyes:  Negative for visual disturbance.   Respiratory:  Negative for cough and shortness of breath.    Cardiovascular:  Negative for chest pain.   Gastrointestinal:  Negative for abdominal pain and diarrhea.   Genitourinary:  Negative for frequency.   Musculoskeletal:  Negative for back pain.   Skin:  Negative for rash.   Neurological:  Negative for headaches.   Hematological:  Negative for adenopathy.  "  Psychiatric/Behavioral:  The patient is not nervous/anxious.      Objective:         Vitals:    01/26/23 1455   BP: 119/77   BP Location: Left arm   Patient Position: Sitting   BP Method: Large (Automatic)   Pulse: 82   SpO2: 96%   Weight: 42.1 kg (92 lb 13 oz)   Height: 5' 3" (1.6 m)         Physical Exam  Constitutional:       Appearance: He is well-developed.   HENT:      Head: Normocephalic.   Eyes:      General: No scleral icterus.     Conjunctiva/sclera: Conjunctivae normal.   Cardiovascular:      Rate and Rhythm: Normal rate and regular rhythm.      Heart sounds: Normal heart sounds. No murmur heard.  Pulmonary:      Effort: Pulmonary effort is normal.      Breath sounds: Normal breath sounds. No wheezing or rales.   Abdominal:      General: Bowel sounds are normal. There is no distension.      Palpations: Abdomen is soft. There is no mass.      Tenderness: There is no abdominal tenderness. There is no guarding or rebound.   Musculoskeletal:         General: Normal range of motion.      Cervical back: Normal range of motion and neck supple.   Skin:     Findings: No erythema or rash.   Neurological:      Mental Status: He is alert and oriented to person, place, and time.      Cranial Nerves: No cranial nerve deficit.       Outside Facility labs Reviewed.  8/24/2017 BUN 17 Creatinine 1.27 Sodium 140 Potassium 3.7 Chloride 106 Calcium 8.7 Phosphorous 2.3 Hemoglobin 8.9 Hematocrit 28.6% plt ct t 190K from   3/7/20/17 Hemoglobin 10.3 g/dL  Hematocrit 31.7%    SPEP-nl    Results for JOHN MCGRATH JR. (MRN [id number]) as of 9/15/2017 10:06   Ref. Range 8/28/2017 12:00   [place] Free Light Chains Latest Ref Range: 0.33 - 1.94 mg/dL 4.70 (H)   Lambda Free Light Chains Latest Ref Range: 0.57 - 2.63 mg/dL 3.35 (H)   Kappa/Lambda FLC Ratio Latest Ref Range: 0.26 - 1.65  1.40     Lab Results   Component Value Date    IRON 99 01/19/2023    TIBC 314 01/19/2023    FERRITIN 42 01/19/2023     Results for JOHN MCGRATH JR. " (MRN [id number]) as of 9/15/2017 10:06   Ref. Range 8/28/2017 12:00   Copper Latest Ref Range: 665 - 1480 ug/L 1239   Differential Method Unknown Automated   RBC Folate Latest Ref Range: 280 - 791 ng/mL 696   Zinc, Serum-ALT Latest Ref Range: 60 - 130 ug/dL 62     Lab Results   Component Value Date    WBC 3.27 (L) 01/19/2023    HGB 10.8 (L) 01/19/2023    HCT 32.7 (L) 01/19/2023     (H) 01/19/2023     (L) 01/19/2023     Lab Results   Component Value Date    IRON 99 01/19/2023    TIBC 314 01/19/2023    FERRITIN 42 01/19/2023     Results for TRESSA MCGRATH JR. (MRN [id number]) as of 9/26/2019 13:55   Ref. Range 9/9/2019 15:10   Protein, Serum Latest Ref Range: 6.0 - 8.4 g/dL 6.4   RBC Folate Latest Ref Range: 280 - 791 ng/mL 697   Zinc, Serum-ALT Latest Ref Range: 60 - 130 ug/dL 46 (L)       Results for TRESSA MCGRATH JR. (MRN [id number]) as of 1/6/2020 12:46   Ref. Range 9/9/2019 15:10 12/30/2019 15:53   Zinc, Serum-ALT Latest Ref Range: 60 - 130 ug/dL 46 (L) 45 (L)       5/2019 Outside Facility [facility]  Indication: Dilatation of abdominal aorta.    Prior studies reviewed: None available.  Procedure: One or more of the following dose lowering techniques were utilized: Automated exposure control, iterative reconstruction technique, and/or adjustment of the mA and kV according to patient size.   Contiguous 1 mm images were obtained through the abdominal aorta and the pelvic vessels down through the bifurcation of the common femoral arteries. Additional 1 mm images were obtained after 100 ml intravenous contrast. The raw data was then sent to a separate post processing workstation where 3-D MIP images were obtained and vessel analysis was performed.    Findings: The abdominal aorta is normal in caliber. There is no significant atherosclerotic disease. No stenosis or aneurysm is seen.    The celiac, SMA and KAREN are patent. The renal arteries are also patent.    In the pelvis, the iliac vessels and the common femoral  artery are patent.    Other CT findings include a 1.5 cm rounded lesion in the right adrenal gland, most likely an adenoma. In the pelvis, there was a small amount of free fluid.    IMPRESSION:   1. No significant vascular disease is seen in the abdomen or pelvis. There is no atherosclerosis or aneurysm.  2. Probable adenoma in the right adrenal gland.  3. There is a small amount of free fluid in the pelvis, of uncertain etiology.               Assessment:       1. Iron deficiency anemia, unspecified iron deficiency anemia type    2. Zinc deficiency, history of    3. Thrombocytopenia            Plan:   1. 51 -year-old with history type 2 diabetes mellitus with diabetic chronic kidney disease,Chronic kidney disease stage III   Pt has a mild anemia dating back to at least 2016   CKD likely contributing factor  Has hx of iron def -recent Fe parameters wnl   Hb 10.8 g/dL   Fe paramters OK  S/p GI eval ( Dr. Rodriguez) - no bleeding source    3. Resolved    4. Recheck B12, MMA     CBC, Fe studies, Zinc, MMA, SPEP, ISHAN. FLC prior to f/u 4mos      5.Cr level 1.8 to 1.6mg/dL   Recent Cr level 1.7mg/dL   Follow-up with Nephrology     Follow up with PCP for med mgmt      Follow-up in 4 mos with cbc,Fe studies,cmp MMA, ZINC, SPEP, ISHAN 1 week   prior to f/u     Cc Emil Montejo M.D.        Alpesh Rodriguez II, MD

## 2023-01-27 DIAGNOSIS — E60 ZINC DEFICIENCY: ICD-10-CM

## 2023-01-27 DIAGNOSIS — D51.8 OTHER VITAMIN B12 DEFICIENCY ANEMIAS: ICD-10-CM

## 2023-01-27 DIAGNOSIS — D69.6 THROMBOCYTOPENIA: ICD-10-CM

## 2023-01-27 DIAGNOSIS — D50.9 IRON DEFICIENCY ANEMIA, UNSPECIFIED IRON DEFICIENCY ANEMIA TYPE: Primary | ICD-10-CM

## 2023-05-22 ENCOUNTER — LAB VISIT (OUTPATIENT)
Dept: LAB | Facility: HOSPITAL | Age: 53
End: 2023-05-22
Attending: INTERNAL MEDICINE
Payer: MEDICARE

## 2023-05-22 DIAGNOSIS — E60 ZINC DEFICIENCY: ICD-10-CM

## 2023-05-22 DIAGNOSIS — D50.9 IRON DEFICIENCY ANEMIA, UNSPECIFIED IRON DEFICIENCY ANEMIA TYPE: ICD-10-CM

## 2023-05-22 DIAGNOSIS — D69.6 THROMBOCYTOPENIA: ICD-10-CM

## 2023-05-22 DIAGNOSIS — D51.8 OTHER VITAMIN B12 DEFICIENCY ANEMIAS: ICD-10-CM

## 2023-05-22 LAB
ALBUMIN SERPL BCP-MCNC: 4.3 G/DL (ref 3.5–5.2)
ALP SERPL-CCNC: 66 U/L (ref 55–135)
ALT SERPL W/O P-5'-P-CCNC: 16 U/L (ref 10–44)
ANION GAP SERPL CALC-SCNC: 8 MMOL/L (ref 8–16)
AST SERPL-CCNC: 20 U/L (ref 10–40)
BASOPHILS # BLD AUTO: 0.01 K/UL (ref 0–0.2)
BASOPHILS NFR BLD: 0.3 % (ref 0–1.9)
BILIRUB SERPL-MCNC: 0.7 MG/DL (ref 0.1–1)
BUN SERPL-MCNC: 31 MG/DL (ref 6–20)
CALCIUM SERPL-MCNC: 9.6 MG/DL (ref 8.7–10.5)
CHLORIDE SERPL-SCNC: 107 MMOL/L (ref 95–110)
CO2 SERPL-SCNC: 23 MMOL/L (ref 23–29)
CREAT SERPL-MCNC: 1.7 MG/DL (ref 0.5–1.4)
DIFFERENTIAL METHOD: ABNORMAL
EOSINOPHIL # BLD AUTO: 0 K/UL (ref 0–0.5)
EOSINOPHIL NFR BLD: 0.6 % (ref 0–8)
ERYTHROCYTE [DISTWIDTH] IN BLOOD BY AUTOMATED COUNT: 11.9 % (ref 11.5–14.5)
EST. GFR  (NO RACE VARIABLE): 48 ML/MIN/1.73 M^2
FERRITIN SERPL-MCNC: 40 NG/ML (ref 20–300)
GLUCOSE SERPL-MCNC: 133 MG/DL (ref 70–110)
HCT VFR BLD AUTO: 38 % (ref 40–54)
HGB BLD-MCNC: 12.6 G/DL (ref 14–18)
IMM GRANULOCYTES # BLD AUTO: 0.01 K/UL (ref 0–0.04)
IMM GRANULOCYTES NFR BLD AUTO: 0.3 % (ref 0–0.5)
IRON SERPL-MCNC: 110 UG/DL (ref 45–160)
IRON SERPL-MCNC: 110 UG/DL (ref 45–160)
LYMPHOCYTES # BLD AUTO: 0.6 K/UL (ref 1–4.8)
LYMPHOCYTES NFR BLD: 18.8 % (ref 18–48)
MCH RBC QN AUTO: 33.1 PG (ref 27–31)
MCHC RBC AUTO-ENTMCNC: 33.2 G/DL (ref 32–36)
MCV RBC AUTO: 100 FL (ref 82–98)
MONOCYTES # BLD AUTO: 0.2 K/UL (ref 0.3–1)
MONOCYTES NFR BLD: 5.5 % (ref 4–15)
NEUTROPHILS # BLD AUTO: 2.5 K/UL (ref 1.8–7.7)
NEUTROPHILS NFR BLD: 74.5 % (ref 38–73)
NRBC BLD-RTO: 0 /100 WBC
PLATELET # BLD AUTO: 153 K/UL (ref 150–450)
PMV BLD AUTO: 11 FL (ref 9.2–12.9)
POTASSIUM SERPL-SCNC: 4.8 MMOL/L (ref 3.5–5.1)
PROT SERPL-MCNC: 7.9 G/DL (ref 6–8.4)
RBC # BLD AUTO: 3.81 M/UL (ref 4.6–6.2)
SATURATED IRON: 28 % (ref 20–50)
SODIUM SERPL-SCNC: 138 MMOL/L (ref 136–145)
TOTAL IRON BINDING CAPACITY: 386 UG/DL (ref 250–450)
TRANSFERRIN SERPL-MCNC: 261 MG/DL (ref 200–375)
WBC # BLD AUTO: 3.29 K/UL (ref 3.9–12.7)

## 2023-05-22 PROCEDURE — 83921 ORGANIC ACID SINGLE QUANT: CPT | Performed by: INTERNAL MEDICINE

## 2023-05-22 PROCEDURE — 82728 ASSAY OF FERRITIN: CPT | Performed by: INTERNAL MEDICINE

## 2023-05-22 PROCEDURE — 85025 COMPLETE CBC W/AUTO DIFF WBC: CPT | Performed by: INTERNAL MEDICINE

## 2023-05-22 PROCEDURE — 80053 COMPREHEN METABOLIC PANEL: CPT | Performed by: INTERNAL MEDICINE

## 2023-05-22 PROCEDURE — 84165 PROTEIN E-PHORESIS SERUM: CPT | Mod: 26,,, | Performed by: PATHOLOGY

## 2023-05-22 PROCEDURE — 84165 PATHOLOGIST INTERPRETATION SPE: ICD-10-PCS | Mod: 26,,, | Performed by: PATHOLOGY

## 2023-05-22 PROCEDURE — 84466 ASSAY OF TRANSFERRIN: CPT | Performed by: INTERNAL MEDICINE

## 2023-05-22 PROCEDURE — 84165 PROTEIN E-PHORESIS SERUM: CPT | Performed by: INTERNAL MEDICINE

## 2023-05-22 PROCEDURE — 36415 COLL VENOUS BLD VENIPUNCTURE: CPT | Performed by: INTERNAL MEDICINE

## 2023-05-22 PROCEDURE — 84630 ASSAY OF ZINC: CPT | Performed by: INTERNAL MEDICINE

## 2023-05-24 LAB
ALBUMIN SERPL ELPH-MCNC: 4.33 G/DL (ref 3.35–5.55)
ALPHA1 GLOB SERPL ELPH-MCNC: 0.32 G/DL (ref 0.17–0.41)
ALPHA2 GLOB SERPL ELPH-MCNC: 0.7 G/DL (ref 0.43–0.99)
B-GLOBULIN SERPL ELPH-MCNC: 0.71 G/DL (ref 0.5–1.1)
GAMMA GLOB SERPL ELPH-MCNC: 1.34 G/DL (ref 0.67–1.58)
PATHOLOGIST INTERPRETATION SPE: NORMAL
PROT SERPL-MCNC: 7.4 G/DL (ref 6–8.4)
ZINC SERPL-MCNC: 61 UG/DL (ref 60–130)

## 2023-05-25 LAB — METHYLMALONATE SERPL-SCNC: 0.32 UMOL/L

## 2023-05-30 ENCOUNTER — OFFICE VISIT (OUTPATIENT)
Dept: HEMATOLOGY/ONCOLOGY | Facility: CLINIC | Age: 53
End: 2023-05-30
Payer: MEDICARE

## 2023-05-30 VITALS
HEART RATE: 79 BPM | BODY MASS INDEX: 15.58 KG/M2 | HEIGHT: 63 IN | DIASTOLIC BLOOD PRESSURE: 81 MMHG | SYSTOLIC BLOOD PRESSURE: 123 MMHG | OXYGEN SATURATION: 97 % | WEIGHT: 87.94 LBS

## 2023-05-30 DIAGNOSIS — R76.8 ELEVATED SERUM IMMUNOGLOBULIN FREE LIGHT CHAINS: ICD-10-CM

## 2023-05-30 DIAGNOSIS — N18.9 CHRONIC KIDNEY DISEASE, UNSPECIFIED CKD STAGE: ICD-10-CM

## 2023-05-30 DIAGNOSIS — D50.9 IRON DEFICIENCY ANEMIA, UNSPECIFIED IRON DEFICIENCY ANEMIA TYPE: Primary | ICD-10-CM

## 2023-05-30 PROCEDURE — 99213 OFFICE O/P EST LOW 20 MIN: CPT | Mod: S$PBB,,, | Performed by: INTERNAL MEDICINE

## 2023-05-30 PROCEDURE — 99999 PR PBB SHADOW E&M-EST. PATIENT-LVL V: ICD-10-PCS | Mod: PBBFAC,,, | Performed by: INTERNAL MEDICINE

## 2023-05-30 PROCEDURE — 99215 OFFICE O/P EST HI 40 MIN: CPT | Mod: PBBFAC | Performed by: INTERNAL MEDICINE

## 2023-05-30 PROCEDURE — 99213 PR OFFICE/OUTPT VISIT, EST, LEVL III, 20-29 MIN: ICD-10-PCS | Mod: S$PBB,,, | Performed by: INTERNAL MEDICINE

## 2023-05-30 PROCEDURE — 99999 PR PBB SHADOW E&M-EST. PATIENT-LVL V: CPT | Mod: PBBFAC,,, | Performed by: INTERNAL MEDICINE

## 2023-05-30 RX ORDER — FLUOCINOLONE ACETONIDE 0.11 MG/ML
1 OIL AURICULAR (OTIC)
COMMUNITY
Start: 2023-05-22

## 2023-05-30 RX ORDER — TRIAMCINOLONE ACETONIDE 1 MG/G
1 OINTMENT TOPICAL
COMMUNITY

## 2023-05-30 RX ORDER — TAMSULOSIN HYDROCHLORIDE 0.4 MG/1
1 CAPSULE ORAL DAILY
COMMUNITY
Start: 2023-02-16

## 2023-05-30 NOTE — PROGRESS NOTES
Subjective:       Patient ID: Jarrett Odonnell Jr. is a 52 y.o. male.    Chief Complaint: Anemia         Diagnosis: KELI      52-year-old gentleman with past medical history of type 2 diabetes mellitus, CK D3, hypertension seen today for f/u for KELI  He is accompanied by his mother.    No history of blood transfusions.   Appetite and weight stable.  No fevers or night sweats.  CBC from 8/24/2017 revealed a white blood cell count of 4100/MM 3 hemoglobin of 8.9 g/dL hematocrit of 28.6% MCV of 82.4 and a platelet count of 199K . S/p Injectafer completed 9/2017    Interval Hx; Accompanied by mother  No new issues  In good spirits  No fatigue  Appetite and weight stable  No fatigue  No SOB/CP  No melena, hematochezia or change in bowel habits  Mother requesting Nephrology referral    He is followed at outside facility for adrenal lesion  Recent imaging outside facility reveals probable adrenal adenoma        hemoglobin of  12.6 g/dL       Pt s/p GI eval 10/2017  EGD- nl esophagus, gastric polyp  Colonoscopy -ds, o/w unremarkable        PAST MEDICAL HISTORY type 2 diabetes mellitus with diabetic chronic kidney disease, hypertensive chronic kidney disease, chronic kidney disease stage III      PAST SURGICAL HISTORY : Orchiectomy     SOCIAL HISTORY : No  history of tobacco use.  No history of ETOH use.  He lives with his parents.  He attends [employer].  No smoke or alcohol use      Review of Systems   Constitutional:  Negative for appetite change, fatigue, fever and unexpected weight change.   HENT:  Negative for mouth sores.    Eyes:  Negative for visual disturbance.   Respiratory:  Negative for cough and shortness of breath.    Cardiovascular:  Negative for chest pain.   Gastrointestinal:  Negative for abdominal pain and diarrhea.   Genitourinary:  Negative for frequency.   Musculoskeletal:  Negative for back pain.   Skin:  Negative for rash.   Neurological:  Negative for headaches.   Hematological:  Negative  "for adenopathy.   Psychiatric/Behavioral:  The patient is not nervous/anxious.      Objective:         Vitals:    05/30/23 1515   BP: 123/81   Pulse: 79   SpO2: 97%   Weight: 39.9 kg (87 lb 15.4 oz)   Height: 5' 3" (1.6 m)         Physical Exam  Constitutional:       Appearance: He is well-developed.   HENT:      Head: Normocephalic.   Eyes:      General: No scleral icterus.     Conjunctiva/sclera: Conjunctivae normal.   Cardiovascular:      Rate and Rhythm: Normal rate and regular rhythm.      Heart sounds: Normal heart sounds. No murmur heard.  Pulmonary:      Effort: Pulmonary effort is normal.      Breath sounds: Normal breath sounds. No wheezing or rales.   Abdominal:      General: Bowel sounds are normal. There is no distension.      Palpations: Abdomen is soft. There is no mass.      Tenderness: There is no abdominal tenderness. There is no guarding or rebound.   Musculoskeletal:         General: Normal range of motion.      Cervical back: Normal range of motion and neck supple.   Skin:     Findings: No erythema or rash.   Neurological:      Mental Status: He is alert and oriented to person, place, and time.      Cranial Nerves: No cranial nerve deficit.       Outside Facility labs Reviewed.  8/24/2017 BUN 17 Creatinine 1.27 Sodium 140 Potassium 3.7 Chloride 106 Calcium 8.7 Phosphorous 2.3 Hemoglobin 8.9 Hematocrit 28.6% plt ct t 190K from   3/7/20/17 Hemoglobin 10.3 g/dL  Hematocrit 31.7%    SPEP-nl    Results for JOHN MCGRATH JR. (MRN [id number]) as of 9/15/2017 10:06   Ref. Range 8/28/2017 12:00   [place] Free Light Chains Latest Ref Range: 0.33 - 1.94 mg/dL 4.70 (H)   Lambda Free Light Chains Latest Ref Range: 0.57 - 2.63 mg/dL 3.35 (H)   Kappa/Lambda FLC Ratio Latest Ref Range: 0.26 - 1.65  1.40     Lab Results   Component Value Date    IRON 110 05/22/2023    IRON 110 05/22/2023    TIBC 386 05/22/2023    FERRITIN 40 05/22/2023     Results for JOHN MCGRATH JR. (MRN [id number]) as of 9/15/2017 10:06   Ref. " Range 8/28/2017 12:00   Copper Latest Ref Range: 665 - 1480 ug/L 1239   Differential Method Unknown Automated   RBC Folate Latest Ref Range: 280 - 791 ng/mL 696   Zinc, Serum-ALT Latest Ref Range: 60 - 130 ug/dL 62     Lab Results   Component Value Date    WBC 3.29 (L) 05/22/2023    HGB 12.6 (L) 05/22/2023    HCT 38.0 (L) 05/22/2023     (H) 05/22/2023     05/22/2023     Lab Results   Component Value Date    IRON 110 05/22/2023    IRON 110 05/22/2023    TIBC 386 05/22/2023    FERRITIN 40 05/22/2023     Results for TRESSA MCGRATH JR. (MRN [id number]) as of 9/26/2019 13:55   Ref. Range 9/9/2019 15:10   Protein, Serum Latest Ref Range: 6.0 - 8.4 g/dL 6.4   RBC Folate Latest Ref Range: 280 - 791 ng/mL 697   Zinc, Serum-ALT Latest Ref Range: 60 - 130 ug/dL 46 (L)       Results for TRESSA MCGRATH JR. (MRN [id number]) as of 1/6/2020 12:46   Ref. Range 9/9/2019 15:10 12/30/2019 15:53   Zinc, Serum-ALT Latest Ref Range: 60 - 130 ug/dL 46 (L) 45 (L)       5/2019 Outside Facility [facility]  Indication: Dilatation of abdominal aorta.    Prior studies reviewed: None available.  Procedure: One or more of the following dose lowering techniques were utilized: Automated exposure control, iterative reconstruction technique, and/or adjustment of the mA and kV according to patient size.   Contiguous 1 mm images were obtained through the abdominal aorta and the pelvic vessels down through the bifurcation of the common femoral arteries. Additional 1 mm images were obtained after 100 ml intravenous contrast. The raw data was then sent to a separate post processing workstation where 3-D MIP images were obtained and vessel analysis was performed.    Findings: The abdominal aorta is normal in caliber. There is no significant atherosclerotic disease. No stenosis or aneurysm is seen.    The celiac, SMA and KAREN are patent. The renal arteries are also patent.    In the pelvis, the iliac vessels and the common femoral artery are  patent.    Other CT findings include a 1.5 cm rounded lesion in the right adrenal gland, most likely an adenoma. In the pelvis, there was a small amount of free fluid.    IMPRESSION:   1. No significant vascular disease is seen in the abdomen or pelvis. There is no atherosclerosis or aneurysm.  2. Probable adenoma in the right adrenal gland.  3. There is a small amount of free fluid in the pelvis, of uncertain etiology.            Latest Reference Range & Units 08/28/17 12:00 09/19/22 15:30   [place] Free Light Chains 0.33 - 1.94 mg/dL 4.70 (H) 6.73 (H)   [place] Free Light Chains 0.57 - 2.63 mg/dL 3.35 (H) 3.83 (H)   Kappa/Lambda FLC Ratio 0.26 - 1.65  1.40 1.76 (H)   (H): Data is abnormally high      SPEP 5/22/23  Signed on 05/24/23 at 12:12   Normal total protein, normal pattern.   Assessment:       1. Iron deficiency anemia, unspecified iron deficiency anemia type    2. Elevated serum immunoglobulin free light chains    3. Chronic kidney disease, unspecified CKD stage              Plan:   1. 51 -year-old with history type 2 diabetes mellitus with diabetic chronic kidney disease,Chronic kidney disease stage III   Pt has a mild anemia dating back to at least 2016   CKD likely contributing factor  Has hx of iron def -recent Fe parameters wnl   Hb 12.6 g/dL   Fe parameters OK  S/p GI eval ( Dr. Rodriguez) - no bleeding source    2. MGUS with renal impairment  Monitor  Repeat SPEP, ISHAN. FLC    3. Cr level 1.8 to 1.6mg/dL   Recent Cr level 1.7mg/dL   Follow-up with Nephrology            Follow up with PCP for med mgmt    Referral to Nephrology- requests [facility] location  Follow-up in 6 mos with cbc,Fe studies,cmp, SPEP, ISHAN, Free light chains, and Quant Ig  1 week   prior to f/u     Cc Emil Montejo M.D.        Alpesh Rodriguez II, MD

## 2023-08-18 ENCOUNTER — TELEPHONE (OUTPATIENT)
Dept: NEPHROLOGY | Facility: CLINIC | Age: 53
End: 2023-08-18
Payer: MEDICARE

## 2023-09-01 ENCOUNTER — TELEPHONE (OUTPATIENT)
Dept: NEPHROLOGY | Facility: CLINIC | Age: 53
End: 2023-09-01
Payer: MEDICARE

## 2024-04-09 ENCOUNTER — OFFICE VISIT (OUTPATIENT)
Dept: HEMATOLOGY/ONCOLOGY | Facility: CLINIC | Age: 54
End: 2024-04-09
Payer: MEDICARE

## 2024-04-09 VITALS
RESPIRATION RATE: 18 BRPM | OXYGEN SATURATION: 95 % | HEART RATE: 68 BPM | DIASTOLIC BLOOD PRESSURE: 78 MMHG | WEIGHT: 92.56 LBS | SYSTOLIC BLOOD PRESSURE: 128 MMHG | BODY MASS INDEX: 16.4 KG/M2

## 2024-04-09 DIAGNOSIS — D50.9 IRON DEFICIENCY ANEMIA, UNSPECIFIED IRON DEFICIENCY ANEMIA TYPE: Primary | ICD-10-CM

## 2024-04-09 DIAGNOSIS — R76.8 ELEVATED SERUM IMMUNOGLOBULIN FREE LIGHT CHAINS: ICD-10-CM

## 2024-04-09 DIAGNOSIS — N18.9 CHRONIC KIDNEY DISEASE, UNSPECIFIED CKD STAGE: ICD-10-CM

## 2024-04-09 PROCEDURE — 99999 PR PBB SHADOW E&M-EST. PATIENT-LVL IV: CPT | Mod: PBBFAC,,, | Performed by: INTERNAL MEDICINE

## 2024-04-09 PROCEDURE — 99214 OFFICE O/P EST MOD 30 MIN: CPT | Mod: PBBFAC | Performed by: INTERNAL MEDICINE

## 2024-04-09 PROCEDURE — 99213 OFFICE O/P EST LOW 20 MIN: CPT | Mod: S$PBB,,, | Performed by: INTERNAL MEDICINE

## 2024-04-09 RX ORDER — FERROUS SULFATE 325(65) MG
325 TABLET ORAL DAILY
Qty: 30 TABLET | Refills: 1 | Status: SHIPPED | OUTPATIENT
Start: 2024-04-09 | End: 2024-05-01

## 2024-04-09 RX ORDER — EMPAGLIFLOZIN 10 MG/1
10 TABLET, FILM COATED ORAL DAILY
COMMUNITY

## 2024-04-09 NOTE — Clinical Note
CBC and FE studies in 2mos   Follow-up in 6 mos with cbc,Fe studies,cmp, SPEP, ISHAN, Free light chains, and Quant Ig  1 week   prior to f/u  at

## 2024-04-09 NOTE — PROGRESS NOTES
Subjective:       Patient ID: Jarrett Odonnell Jr. is a 53 y.o. male.    Chief Complaint: Follow-up (/)         Diagnosis: KELI      52-year-old gentleman with past medical history of type 2 diabetes mellitus, CK D3, hypertension seen today for f/u for KELI  He is accompanied by his mother.    No history of blood transfusions.   Appetite and weight stable.  No fevers or night sweats.  CBC from 8/24/2017 revealed a white blood cell count of 4100/MM 3 hemoglobin of 8.9 g/dL hematocrit of 28.6% MCV of 82.4 and a platelet count of 199K . S/p Injectafer completed 9/2017    Interval Hx; Accompanied by mother  No new issues  In good spirits  He was started on Jardiance about 1 yr abo  No fatigue  Appetite and weight stable  No SOB/CP  No melena, hematochezia or change in bowel habits      He is followed at outside facility for adrenal lesion  Recent imaging outside facility reveals probable adrenal adenoma        hemoglobin of  12.6 g/dL       Pt s/p GI eval 10/2017  EGD- nl esophagus, gastric polyp  Colonoscopy -ds, o/w unremarkable        PAST MEDICAL HISTORY type 2 diabetes mellitus with diabetic chronic kidney disease, hypertensive chronic kidney disease, chronic kidney disease stage III      PAST SURGICAL HISTORY : Orchiectomy     SOCIAL HISTORY : No  history of tobacco use.  No history of ETOH use.  He lives with his parents.  He attends [employer].  No smoke or alcohol use      Review of Systems   Constitutional:  Negative for appetite change, fatigue, fever and unexpected weight change.   HENT:  Negative for mouth sores.    Eyes:  Negative for visual disturbance.   Respiratory:  Negative for cough and shortness of breath.    Cardiovascular:  Negative for chest pain.   Gastrointestinal:  Negative for abdominal pain and diarrhea.   Genitourinary:  Negative for frequency.   Musculoskeletal:  Negative for back pain.   Skin:  Negative for rash.   Neurological:  Negative for headaches.   Hematological:  Negative  for adenopathy.   Psychiatric/Behavioral:  The patient is not nervous/anxious.        Objective:       Vitals:    04/09/24 1311   BP: 128/78   Pulse: 68   Resp: 18   SpO2: 95%   Weight: 42 kg (92 lb 9.5 oz)           Physical Exam  Constitutional:       Appearance: He is well-developed.   HENT:      Head: Normocephalic.   Eyes:      General: No scleral icterus.     Conjunctiva/sclera: Conjunctivae normal.   Cardiovascular:      Rate and Rhythm: Normal rate and regular rhythm.      Heart sounds: Normal heart sounds. No murmur heard.  Pulmonary:      Effort: Pulmonary effort is normal.      Breath sounds: Normal breath sounds. No wheezing or rales.   Abdominal:      General: Bowel sounds are normal. There is no distension.      Palpations: Abdomen is soft. There is no mass.      Tenderness: There is no abdominal tenderness. There is no guarding or rebound.   Musculoskeletal:         General: Normal range of motion.      Cervical back: Normal range of motion and neck supple.   Skin:     Findings: No erythema or rash.   Neurological:      Mental Status: He is alert and oriented to person, place, and time.      Cranial Nerves: No cranial nerve deficit.         Outside Facility labs Reviewed.  8/24/2017 BUN 17 Creatinine 1.27 Sodium 140 Potassium 3.7 Chloride 106 Calcium 8.7 Phosphorous 2.3 Hemoglobin 8.9 Hematocrit 28.6% plt ct t 190K from   3/7/20/17 Hemoglobin 10.3 g/dL  Hematocrit 31.7%    SPEP-nl    Results for JOHN MCGRATH JR. (MRN [id number]) as of 9/15/2017 10:06   Ref. Range 8/28/2017 12:00   [place] Free Light Chains Latest Ref Range: 0.33 - 1.94 mg/dL 4.70 (H)   [place] Free Light Chains Latest Ref Range: 0.57 - 2.63 mg/dL 3.35 (H)   Kappa/Lambda FLC Ratio Latest Ref Range: 0.26 - 1.65  1.40     Lab Results   Component Value Date    IRON 110 05/22/2023    IRON 110 05/22/2023    TIBC 386 05/22/2023    FERRITIN 40 05/22/2023     Results for RAMILA JOHN ИВАН GALICIA (MRN [id number]) as of 9/15/2017 10:06   Ref. Range  8/28/2017 12:00   Copper Latest Ref Range: 665 - 1480 ug/L 1239   Differential Method Unknown Automated   RBC Folate Latest Ref Range: 280 - 791 ng/mL 696   Zinc, Serum-ALT Latest Ref Range: 60 - 130 ug/dL 62     Lab Results   Component Value Date    WBC 3.29 (L) 05/22/2023    HGB 12.6 (L) 05/22/2023    HCT 38.0 (L) 05/22/2023     (H) 05/22/2023     05/22/2023     Lab Results   Component Value Date    IRON 110 05/22/2023    IRON 110 05/22/2023    TIBC 386 05/22/2023    FERRITIN 40 05/22/2023     Results for TRESSA MCGRATH JR. (MRN [id number]) as of 9/26/2019 13:55   Ref. Range 9/9/2019 15:10   Protein, Serum Latest Ref Range: 6.0 - 8.4 g/dL 6.4   RBC Folate Latest Ref Range: 280 - 791 ng/mL 697   Zinc, Serum-ALT Latest Ref Range: 60 - 130 ug/dL 46 (L)       Results for TRESSA MCGRATH JR. (MRN [id number]) as of 1/6/2020 12:46   Ref. Range 9/9/2019 15:10 12/30/2019 15:53   Zinc, Serum-ALT Latest Ref Range: 60 - 130 ug/dL 46 (L) 45 (L)       5/2019 Outside Facility [facility]  Indication: Dilatation of abdominal aorta.    Prior studies reviewed: None available.  Procedure: One or more of the following dose lowering techniques were utilized: Automated exposure control, iterative reconstruction technique, and/or adjustment of the mA and kV according to patient size.   Contiguous 1 mm images were obtained through the abdominal aorta and the pelvic vessels down through the bifurcation of the common femoral arteries. Additional 1 mm images were obtained after 100 ml intravenous contrast. The raw data was then sent to a separate post processing workstation where 3-D MIP images were obtained and vessel analysis was performed.    Findings: The abdominal aorta is normal in caliber. There is no significant atherosclerotic disease. No stenosis or aneurysm is seen.    The celiac, SMA and KAREN are patent. The renal arteries are also patent.    In the pelvis, the iliac vessels and the common femoral artery are patent.    Other CT  findings include a 1.5 cm rounded lesion in the right adrenal gland, most likely an adenoma. In the pelvis, there was a small amount of free fluid.    IMPRESSION:   1. No significant vascular disease is seen in the abdomen or pelvis. There is no atherosclerosis or aneurysm.  2. Probable adenoma in the right adrenal gland.  3. There is a small amount of free fluid in the pelvis, of uncertain etiology.               SPEP 5/22/23  Signed on 05/24/23 at 12:12   Normal total protein, normal pattern.     [facility] 3/28/24  Ferritin 7  Fe 96   TIBC 341     Hb 11.4g/dl  Hct 34.2%    Plt 164     Assessment:       1. Iron deficiency anemia, unspecified iron deficiency anemia type    2. Elevated serum immunoglobulin free light chains    3. Chronic kidney disease, unspecified CKD stage                Plan:   1. 53 -year-old with history type 2 diabetes mellitus with diabetic chronic kidney disease,Chronic kidney disease stage III   Pt has a mild anemia dating back to at least 2016   CKD likely contributing factor  Has hx of iron def -recent Fe parameters wnl   Hb 11.4  g/dL   Ferritin 7   S/p GI eval ( Dr. Rodriguez) 2019?- no bleeding source  Plan Fe supp  Rx provided for Ferrous Sulfate 325 mg po qd  CBC and FE studies in 2mos     2. MGUS with renal impairment  [facility] labs reviewed  No signs of disease progression  Monitor  Repeat SPEP, ISHAN. FLC on f/u     3. Cr level 1.8 to 1.6mg/dL   Recent Cr level 1.7mg/dL on 3/28/24    Follow-up with Nephrology            Follow up with PCP for med mgmt    CBC and FE studies in 2mos   Follow-up in 6 mos with cbc,Fe studies,cmp, SPEP, ISHAN, Free light chains, and Quant Ig  1 week   prior to f/u  at      Emil Montejo M.D.        Alpesh Rodriguez II, MD

## 2024-05-01 DIAGNOSIS — D50.9 IRON DEFICIENCY ANEMIA, UNSPECIFIED IRON DEFICIENCY ANEMIA TYPE: ICD-10-CM

## 2024-05-01 RX ORDER — FERROUS SULFATE 325(65) MG
TABLET ORAL
Qty: 90 TABLET | Refills: 0 | Status: SHIPPED | OUTPATIENT
Start: 2024-05-01

## 2024-06-10 ENCOUNTER — LAB VISIT (OUTPATIENT)
Dept: LAB | Facility: HOSPITAL | Age: 54
End: 2024-06-10
Attending: INTERNAL MEDICINE
Payer: MEDICARE

## 2024-06-10 DIAGNOSIS — D50.9 IRON DEFICIENCY ANEMIA, UNSPECIFIED IRON DEFICIENCY ANEMIA TYPE: ICD-10-CM

## 2024-06-10 LAB
BASOPHILS # BLD AUTO: 0.05 K/UL (ref 0–0.2)
BASOPHILS NFR BLD: 1.2 % (ref 0–1.9)
DIFFERENTIAL METHOD BLD: ABNORMAL
EOSINOPHIL # BLD AUTO: 0.2 K/UL (ref 0–0.5)
EOSINOPHIL NFR BLD: 4 % (ref 0–8)
ERYTHROCYTE [DISTWIDTH] IN BLOOD BY AUTOMATED COUNT: 12.8 % (ref 11.5–14.5)
FERRITIN SERPL-MCNC: 42 NG/ML (ref 20–300)
HCT VFR BLD AUTO: 40 % (ref 40–54)
HGB BLD-MCNC: 13.1 G/DL (ref 14–18)
IMM GRANULOCYTES # BLD AUTO: 0.01 K/UL (ref 0–0.04)
IMM GRANULOCYTES NFR BLD AUTO: 0.2 % (ref 0–0.5)
IRON SERPL-MCNC: 113 UG/DL (ref 45–160)
LYMPHOCYTES # BLD AUTO: 1.2 K/UL (ref 1–4.8)
LYMPHOCYTES NFR BLD: 28.3 % (ref 18–48)
MCH RBC QN AUTO: 33.1 PG (ref 27–31)
MCHC RBC AUTO-ENTMCNC: 32.8 G/DL (ref 32–36)
MCV RBC AUTO: 101 FL (ref 82–98)
MONOCYTES # BLD AUTO: 0.6 K/UL (ref 0.3–1)
MONOCYTES NFR BLD: 14.5 % (ref 4–15)
NEUTROPHILS # BLD AUTO: 2.2 K/UL (ref 1.8–7.7)
NEUTROPHILS NFR BLD: 51.8 % (ref 38–73)
NRBC BLD-RTO: 0 /100 WBC
PLATELET # BLD AUTO: 167 K/UL (ref 150–450)
PMV BLD AUTO: 11.3 FL (ref 9.2–12.9)
RBC # BLD AUTO: 3.96 M/UL (ref 4.6–6.2)
SATURATED IRON: 32 % (ref 20–50)
TOTAL IRON BINDING CAPACITY: 354 UG/DL (ref 250–450)
TRANSFERRIN SERPL-MCNC: 239 MG/DL (ref 200–375)
WBC # BLD AUTO: 4.28 K/UL (ref 3.9–12.7)

## 2024-06-10 PROCEDURE — 83540 ASSAY OF IRON: CPT | Performed by: INTERNAL MEDICINE

## 2024-06-10 PROCEDURE — 36415 COLL VENOUS BLD VENIPUNCTURE: CPT | Performed by: INTERNAL MEDICINE

## 2024-06-10 PROCEDURE — 85025 COMPLETE CBC W/AUTO DIFF WBC: CPT | Performed by: INTERNAL MEDICINE

## 2024-06-10 PROCEDURE — 82728 ASSAY OF FERRITIN: CPT | Performed by: INTERNAL MEDICINE

## 2024-07-24 DIAGNOSIS — D50.9 IRON DEFICIENCY ANEMIA, UNSPECIFIED IRON DEFICIENCY ANEMIA TYPE: ICD-10-CM

## 2024-07-24 RX ORDER — FERROUS SULFATE 325(65) MG
TABLET ORAL
Qty: 90 TABLET | Refills: 0 | Status: SHIPPED | OUTPATIENT
Start: 2024-07-24

## 2024-07-24 NOTE — TELEPHONE ENCOUNTER
Medication request for Ferrous Sulfate (Feosol) 325mg (65mg)  LOV 4/9/24  Last BW 6/10/24  Next OV 10/2024 (6mo follow up)

## 2024-08-23 ENCOUNTER — TELEPHONE (OUTPATIENT)
Dept: HEMATOLOGY/ONCOLOGY | Facility: CLINIC | Age: 54
End: 2024-08-23
Payer: MEDICARE

## 2024-08-23 NOTE — TELEPHONE ENCOUNTER
Pt 's mom called to ask if its ok that pt stopped taking the rx ferrous sulfate (FEOSOL) 325 mg (65 mg iron) Tab tablet. Caller says  the pt  kept have multiple bowel movements in the morning, pt has been off rx for 3 days...   please c/b to advise..   Tc to parent She stated he has been having 3 stools daily but it is not diarrhea regular formed stools normal in color Denies fever  no abd pain no other concerning symptoms\    Discussed with Dr Sonia Rothman is off  he advised for pt to hold iron another few days and then to resume them if no other issues arise  informed her that he develops fever  abd pain more stools or any adverse changes call us or present directly to ER  She acknowledged understanding

## 2024-08-30 DIAGNOSIS — N18.9 CHRONIC KIDNEY DISEASE, UNSPECIFIED CKD STAGE: Primary | ICD-10-CM

## 2024-09-05 ENCOUNTER — LAB VISIT (OUTPATIENT)
Dept: LAB | Facility: HOSPITAL | Age: 54
End: 2024-09-05
Attending: INTERNAL MEDICINE
Payer: MEDICARE

## 2024-09-05 DIAGNOSIS — N18.9 CHRONIC KIDNEY DISEASE, UNSPECIFIED CKD STAGE: ICD-10-CM

## 2024-09-05 LAB
ALBUMIN SERPL BCP-MCNC: 3.6 G/DL (ref 3.5–5.2)
ANION GAP SERPL CALC-SCNC: 8 MMOL/L (ref 8–16)
BACTERIA #/AREA URNS HPF: NORMAL /HPF
BASOPHILS # BLD AUTO: 0.03 K/UL (ref 0–0.2)
BASOPHILS NFR BLD: 0.8 % (ref 0–1.9)
BILIRUB UR QL STRIP: NEGATIVE
BUN SERPL-MCNC: 28 MG/DL (ref 6–20)
CALCIUM SERPL-MCNC: 9.3 MG/DL (ref 8.7–10.5)
CHLORIDE SERPL-SCNC: 108 MMOL/L (ref 95–110)
CLARITY UR: CLEAR
CO2 SERPL-SCNC: 24 MMOL/L (ref 23–29)
COLOR UR: COLORLESS
CREAT SERPL-MCNC: 1.8 MG/DL (ref 0.5–1.4)
CREAT UR-MCNC: 24.3 MG/DL (ref 23–375)
DIFFERENTIAL METHOD BLD: ABNORMAL
EOSINOPHIL # BLD AUTO: 0.1 K/UL (ref 0–0.5)
EOSINOPHIL NFR BLD: 2.8 % (ref 0–8)
ERYTHROCYTE [DISTWIDTH] IN BLOOD BY AUTOMATED COUNT: 11.9 % (ref 11.5–14.5)
EST. GFR  (NO RACE VARIABLE): 44 ML/MIN/1.73 M^2
GLUCOSE SERPL-MCNC: 134 MG/DL (ref 70–110)
GLUCOSE UR QL STRIP: ABNORMAL
HCT VFR BLD AUTO: 41 % (ref 40–54)
HGB BLD-MCNC: 13.3 G/DL (ref 14–18)
HGB UR QL STRIP: NEGATIVE
IMM GRANULOCYTES # BLD AUTO: 0.01 K/UL (ref 0–0.04)
IMM GRANULOCYTES NFR BLD AUTO: 0.3 % (ref 0–0.5)
KETONES UR QL STRIP: NEGATIVE
LEUKOCYTE ESTERASE UR QL STRIP: NEGATIVE
LYMPHOCYTES # BLD AUTO: 0.8 K/UL (ref 1–4.8)
LYMPHOCYTES NFR BLD: 23.8 % (ref 18–48)
MCH RBC QN AUTO: 33.5 PG (ref 27–31)
MCHC RBC AUTO-ENTMCNC: 32.4 G/DL (ref 32–36)
MCV RBC AUTO: 103 FL (ref 82–98)
MICROSCOPIC COMMENT: NORMAL
MONOCYTES # BLD AUTO: 0.3 K/UL (ref 0.3–1)
MONOCYTES NFR BLD: 9.1 % (ref 4–15)
NEUTROPHILS # BLD AUTO: 2.2 K/UL (ref 1.8–7.7)
NEUTROPHILS NFR BLD: 63.2 % (ref 38–73)
NITRITE UR QL STRIP: NEGATIVE
NRBC BLD-RTO: 0 /100 WBC
PH UR STRIP: 6 [PH] (ref 5–8)
PHOSPHATE SERPL-MCNC: 3.1 MG/DL (ref 2.7–4.5)
PLATELET # BLD AUTO: 181 K/UL (ref 150–450)
PMV BLD AUTO: 11.4 FL (ref 9.2–12.9)
POTASSIUM SERPL-SCNC: 4.1 MMOL/L (ref 3.5–5.1)
PROT UR QL STRIP: NEGATIVE
PROT UR-MCNC: 11 MG/DL
PROT/CREAT UR: 0.45 MG/G{CREAT} (ref 0–0.2)
PTH-INTACT SERPL-MCNC: 101.7 PG/ML (ref 9–77)
RBC # BLD AUTO: 3.97 M/UL (ref 4.6–6.2)
SODIUM SERPL-SCNC: 140 MMOL/L (ref 136–145)
SP GR UR STRIP: <1.005 (ref 1–1.03)
URATE SERPL-MCNC: 6.9 MG/DL (ref 3.4–7)
URN SPEC COLLECT METH UR: ABNORMAL
UROBILINOGEN UR STRIP-ACNC: NEGATIVE EU/DL
WBC # BLD AUTO: 3.53 K/UL (ref 3.9–12.7)
YEAST URNS QL MICRO: NORMAL

## 2024-09-05 PROCEDURE — 83970 ASSAY OF PARATHORMONE: CPT | Performed by: INTERNAL MEDICINE

## 2024-09-05 PROCEDURE — 84550 ASSAY OF BLOOD/URIC ACID: CPT | Performed by: INTERNAL MEDICINE

## 2024-09-05 PROCEDURE — 80069 RENAL FUNCTION PANEL: CPT | Performed by: INTERNAL MEDICINE

## 2024-09-05 PROCEDURE — 85025 COMPLETE CBC W/AUTO DIFF WBC: CPT | Performed by: INTERNAL MEDICINE

## 2024-09-05 PROCEDURE — 81000 URINALYSIS NONAUTO W/SCOPE: CPT | Performed by: INTERNAL MEDICINE

## 2024-09-05 PROCEDURE — 82570 ASSAY OF URINE CREATININE: CPT | Performed by: INTERNAL MEDICINE

## 2024-09-05 PROCEDURE — 36415 COLL VENOUS BLD VENIPUNCTURE: CPT | Performed by: INTERNAL MEDICINE

## 2024-09-11 ENCOUNTER — HOSPITAL ENCOUNTER (EMERGENCY)
Facility: HOSPITAL | Age: 54
Discharge: HOME | End: 2024-09-11
Attending: EMERGENCY MEDICINE
Payer: MEDICARE

## 2024-09-11 VITALS
HEIGHT: 63 IN | WEIGHT: 92 LBS | OXYGEN SATURATION: 97 % | BODY MASS INDEX: 16.3 KG/M2 | TEMPERATURE: 98 F | DIASTOLIC BLOOD PRESSURE: 76 MMHG | HEART RATE: 83 BPM | SYSTOLIC BLOOD PRESSURE: 162 MMHG | RESPIRATION RATE: 16 BRPM

## 2024-09-11 DIAGNOSIS — R42 LIGHTHEADEDNESS: Primary | ICD-10-CM

## 2024-09-11 DIAGNOSIS — R53.83 FATIGUE: ICD-10-CM

## 2024-09-11 LAB
ALBUMIN SERPL BCP-MCNC: 4 G/DL (ref 3.5–5.2)
ALP SERPL-CCNC: 64 U/L (ref 55–135)
ALT SERPL W/O P-5'-P-CCNC: 15 U/L (ref 10–44)
ANION GAP SERPL CALC-SCNC: 11 MMOL/L (ref 8–16)
AST SERPL-CCNC: 20 U/L (ref 10–40)
BACTERIA #/AREA URNS AUTO: NORMAL /HPF
BASOPHILS # BLD AUTO: 0.03 K/UL (ref 0–0.2)
BASOPHILS NFR BLD: 0.6 % (ref 0–1.9)
BILIRUB SERPL-MCNC: 0.6 MG/DL (ref 0.1–1)
BILIRUB UR QL STRIP: NEGATIVE
BUN SERPL-MCNC: 24 MG/DL (ref 6–20)
CALCIUM SERPL-MCNC: 9.9 MG/DL (ref 8.7–10.5)
CHLORIDE SERPL-SCNC: 110 MMOL/L (ref 95–110)
CLARITY UR REFRACT.AUTO: CLEAR
CO2 SERPL-SCNC: 22 MMOL/L (ref 23–29)
COLOR UR AUTO: COLORLESS
CREAT SERPL-MCNC: 1.8 MG/DL (ref 0.5–1.4)
DIFFERENTIAL METHOD BLD: ABNORMAL
EOSINOPHIL # BLD AUTO: 0.1 K/UL (ref 0–0.5)
EOSINOPHIL NFR BLD: 1.1 % (ref 0–8)
ERYTHROCYTE [DISTWIDTH] IN BLOOD BY AUTOMATED COUNT: 12.4 % (ref 11.5–14.5)
EST. GFR  (NO RACE VARIABLE): 43.9 ML/MIN/1.73 M^2
GLUCOSE SERPL-MCNC: 138 MG/DL (ref 70–110)
GLUCOSE UR QL STRIP: ABNORMAL
HCT VFR BLD AUTO: 42.2 % (ref 40–54)
HCV AB SERPL QL IA: NORMAL
HGB BLD-MCNC: 13.6 G/DL (ref 14–18)
HGB UR QL STRIP: NEGATIVE
HIV 1+2 AB+HIV1 P24 AG SERPL QL IA: NORMAL
IMM GRANULOCYTES # BLD AUTO: 0.02 K/UL (ref 0–0.04)
IMM GRANULOCYTES NFR BLD AUTO: 0.4 % (ref 0–0.5)
KETONES UR QL STRIP: NEGATIVE
LEUKOCYTE ESTERASE UR QL STRIP: NEGATIVE
LYMPHOCYTES # BLD AUTO: 0.9 K/UL (ref 1–4.8)
LYMPHOCYTES NFR BLD: 16.7 % (ref 18–48)
MAGNESIUM SERPL-MCNC: 1.9 MG/DL (ref 1.6–2.6)
MCH RBC QN AUTO: 32.7 PG (ref 27–31)
MCHC RBC AUTO-ENTMCNC: 32.2 G/DL (ref 32–36)
MCV RBC AUTO: 101 FL (ref 82–98)
MICROSCOPIC COMMENT: NORMAL
MONOCYTES # BLD AUTO: 0.5 K/UL (ref 0.3–1)
MONOCYTES NFR BLD: 9.5 % (ref 4–15)
NEUTROPHILS # BLD AUTO: 3.9 K/UL (ref 1.8–7.7)
NEUTROPHILS NFR BLD: 71.7 % (ref 38–73)
NITRITE UR QL STRIP: NEGATIVE
NRBC BLD-RTO: 0 /100 WBC
OHS QRS DURATION: 76 MS
OHS QTC CALCULATION: 467 MS
PH UR STRIP: 6 [PH] (ref 5–8)
PLATELET # BLD AUTO: 184 K/UL (ref 150–450)
PMV BLD AUTO: 11.6 FL (ref 9.2–12.9)
POCT GLUCOSE: 144 MG/DL (ref 70–110)
POTASSIUM SERPL-SCNC: 4 MMOL/L (ref 3.5–5.1)
PROT SERPL-MCNC: 7.6 G/DL (ref 6–8.4)
PROT UR QL STRIP: NEGATIVE
RBC # BLD AUTO: 4.16 M/UL (ref 4.6–6.2)
SODIUM SERPL-SCNC: 143 MMOL/L (ref 136–145)
SP GR UR STRIP: 1 (ref 1–1.03)
TROPONIN I SERPL DL<=0.01 NG/ML-MCNC: 0.01 NG/ML (ref 0–0.03)
TSH SERPL DL<=0.005 MIU/L-ACNC: 2.73 UIU/ML (ref 0.4–4)
URN SPEC COLLECT METH UR: ABNORMAL
WBC # BLD AUTO: 5.38 K/UL (ref 3.9–12.7)
YEAST UR QL AUTO: NORMAL

## 2024-09-11 PROCEDURE — 81001 URINALYSIS AUTO W/SCOPE: CPT | Performed by: PHYSICIAN ASSISTANT

## 2024-09-11 PROCEDURE — 93010 ELECTROCARDIOGRAM REPORT: CPT | Mod: ,,, | Performed by: INTERNAL MEDICINE

## 2024-09-11 PROCEDURE — 80053 COMPREHEN METABOLIC PANEL: CPT | Performed by: PHYSICIAN ASSISTANT

## 2024-09-11 PROCEDURE — 86803 HEPATITIS C AB TEST: CPT | Performed by: PHYSICIAN ASSISTANT

## 2024-09-11 PROCEDURE — 96360 HYDRATION IV INFUSION INIT: CPT

## 2024-09-11 PROCEDURE — 99285 EMERGENCY DEPT VISIT HI MDM: CPT | Mod: 25

## 2024-09-11 PROCEDURE — 82962 GLUCOSE BLOOD TEST: CPT

## 2024-09-11 PROCEDURE — 93005 ELECTROCARDIOGRAM TRACING: CPT

## 2024-09-11 PROCEDURE — 83735 ASSAY OF MAGNESIUM: CPT | Performed by: PHYSICIAN ASSISTANT

## 2024-09-11 PROCEDURE — 84484 ASSAY OF TROPONIN QUANT: CPT | Performed by: PHYSICIAN ASSISTANT

## 2024-09-11 PROCEDURE — 63600175 PHARM REV CODE 636 W HCPCS: Performed by: PHYSICIAN ASSISTANT

## 2024-09-11 PROCEDURE — 87389 HIV-1 AG W/HIV-1&-2 AB AG IA: CPT | Performed by: PHYSICIAN ASSISTANT

## 2024-09-11 PROCEDURE — 85025 COMPLETE CBC W/AUTO DIFF WBC: CPT | Performed by: PHYSICIAN ASSISTANT

## 2024-09-11 PROCEDURE — 84443 ASSAY THYROID STIM HORMONE: CPT | Performed by: PHYSICIAN ASSISTANT

## 2024-09-11 RX ADMIN — SODIUM CHLORIDE, POTASSIUM CHLORIDE, SODIUM LACTATE AND CALCIUM CHLORIDE 500 ML: 600; 310; 30; 20 INJECTION, SOLUTION INTRAVENOUS at 12:09
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
## 2024-09-11 NOTE — ED TRIAGE NOTES
Pt reports feeling dizzy and fatigued since this morning, denies any pain, denies falling down or losing consciousness

## 2024-09-11 NOTE — DISCHARGE INSTRUCTIONS
Follow-up with your primary care provider for further evaluation. Return to the emergency room for new, worsening, or concerning symptoms.     Future Appointments   Date Time Provider Department Center   9/18/2024  4:00 PM Gale Hickman MD [facility] NEPHRO [facility] Cli   10/10/2024 11:10 AM LAB, [facility] LAB [facility]

## 2024-09-13 PROBLEM — N25.81 SECONDARY RENAL HYPERPARATHYROIDISM: Status: ACTIVE | Noted: 2024-09-13

## 2024-09-13 PROBLEM — D63.1 ANEMIA IN CHRONIC KIDNEY DISEASE: Status: ACTIVE | Noted: 2024-09-13

## 2024-09-13 PROBLEM — N18.30 STAGE 3 CHRONIC KIDNEY DISEASE: Status: ACTIVE | Noted: 2024-09-13

## 2024-09-13 PROBLEM — I10 ESSENTIAL HYPERTENSION: Status: ACTIVE | Noted: 2024-09-13

## 2024-09-13 PROBLEM — N18.9 ANEMIA IN CHRONIC KIDNEY DISEASE: Status: ACTIVE | Noted: 2024-09-13

## 2024-09-13 NOTE — PROGRESS NOTES
"[facility]  [address] [facility] [address], [address]  [place]  [phone]    Referring Provider: Self, Aaareferral  PCP: Fabián John MD  Hem/Onc: Lorna      HPI: Jarrett Odonnell Jr. is a 54 y.o. male with Aidan syndrome, T2DM, CKD, DLD, and BPH who is here for new patient evaluation of Chronic Kidney Disease  Prior records obtained and reviewed. His baseline Cr has been 1.6-1.8 with GFR 44-51% since 12/2021. Cr 1.8 today. UA with proteinuria since at least 2020. He is currently on lisinopril 20mg daily and Jardiance 10mg daily.   He was diagnosed with T2DM in 2000. Mother recalls his glucose being in the 700s around time of diagnosis. Glucose has since been well-controlled. Chart review notable for A1c </= 6% since at least 2020.   He denies h/o HTN; only on lisinopril for diabetic nephropathy.  Recently had episode of light-headedness which resulted in ED visit for IVF.   No h/o gout, kidney stones, or significant NSAID use.   His anemia is managed by Dr. Rothman. He was started on ferrous sulfate which caused increased BM (up to 4 q AM).         Past Medical History:   Diagnosis Date    Diabetes mellitus     Aidan syndrome        Past Surgical History:   Procedure Laterality Date    ORCHIECTOMY         Family History   Problem Relation Name Age of Onset    Hypertension Mother      Colon cancer Father      Diabetes Maternal Grandmother         Social History     Tobacco Use    Smoking status: Never    Smokeless tobacco: Never   Substance Use Topics    Alcohol use: No    Drug use: No         ROS:  Complete ROS otherwise negative except as indicated above.         Current Outpatient Medications:     atorvastatin (LIPITOR) 10 MG tablet, Take 10 mg by mouth once daily., Disp: , Rfl:     BD AMANDA 2ND GEN PEN NEEDLE 32 gauge x 5/32" Ndle, USE FOUR TIMES DAILY, Disp: 100 each, Rfl: 4    blood sugar diagnostic (BLOOD GLUCOSE TEST) Strp, by Misc.(Non-Drug; Combo Route) route. I strip 4 times a day., Disp: , " "Rfl:     ferrous sulfate (FEOSOL) 325 mg (65 mg iron) Tab tablet, TAKE 1 TABLET BY MOUTH EVERY DAY, Disp: 90 tablet, Rfl: 0    fluocinolone acetonide oiL 0.01 % Drop, Place 1 drop into both ears as needed. Instill 4-5 drops into both ears twice a week for 2 months until follow up appointment., Disp: , Rfl:     insulin aspart U-100 (NOVOLOG) 100 unit/mL injection, Novolog U-100 Insulin aspart 100 unit/mL subcutaneous solution  USE ON SLIDING SCALE AS DIRECTED MAXIMUM OF SIX UNITS, Disp: , Rfl:     insulin syringe-needle U-100 0.5 mL 29 gauge x 1/2" Syrg, Inject 8 Units/day into the muscle once daily. Inject 8 units into the skin every morning., Disp: , Rfl:     JANUVIA 100 mg Tab, Take 50 mg by mouth once daily., Disp: , Rfl:     JARDIANCE 10 mg tablet, Take 10 mg by mouth once daily., Disp: , Rfl:     lisinopril (PRINIVIL,ZESTRIL) 5 MG tablet, Take 20 mg by mouth once daily., Disp: , Rfl:     nateglinide (STARLIX) 60 MG tablet, 120 mg. Take 1 tablet in the morning and 1 tablet at noon and 1 tablet in the evening by mouth., Disp: , Rfl:     omeprazole (PRILOSEC) 20 MG capsule, Take 20 mg by mouth every morning., Disp: , Rfl:     pen needle, diabetic 32 gauge x 5/32" Ndle, BD Ultra-Fine Lauren Pen Needle 32 gauge x 5/32", Disp: , Rfl:     simethicone (MYLICON) 125 MG chewable tablet, Take 125 mg by mouth every 6 (six) hours as needed for Flatulence., Disp: , Rfl:     triamcinolone acetonide 0.1% (KENALOG) 0.1 % ointment, Apply 1 Tube topically as needed., Disp: , Rfl:     tamsulosin (FLOMAX) 0.4 mg Cap, Take 1 capsule by mouth once daily. (Patient not taking: Reported on 9/18/2024), Disp: , Rfl:       Vitals: Blood pressure 122/74, pulse 76, resp. rate 18, height 5' 3" (1.6 m), weight 42.6 kg (93 lb 14.7 oz), SpO2 97%. Body mass index is 16.64 kg/m².    Physical Exam  Vitals reviewed.   Constitutional:       General: He is awake. He is not in acute distress.     Appearance: He is not ill-appearing.   HENT:      Head: " Normocephalic and atraumatic.      Nose: Nose normal.      Mouth/Throat:      Mouth: Mucous membranes are moist.   Eyes:      Extraocular Movements: Extraocular movements intact.      Conjunctiva/sclera: Conjunctivae normal.   Pulmonary:      Effort: Pulmonary effort is normal.   Musculoskeletal:         General: No tenderness or signs of injury.      Right lower leg: No edema.      Left lower leg: No edema.   Skin:     General: Skin is warm and dry.      Findings: No erythema or rash.   Neurological:      Mental Status: He is alert. Mental status is at baseline.   Psychiatric:         Mood and Affect: Mood normal.         Behavior: Behavior normal.           Labs/Imaging:  Sodium   Date Value Ref Range Status   09/11/2024 143 136 - 145 mmol/L Final   09/05/2024 140 136 - 145 mmol/L Final   04/22/2024 141 135 - 146 mmol/L Final   11/07/2023 142 135 - 146 mmol/L Final   05/22/2023 138 136 - 145 mmol/L Final     Potassium   Date Value Ref Range Status   09/11/2024 4.0 3.5 - 5.1 mmol/L Final   09/05/2024 4.1 3.5 - 5.1 mmol/L Final   04/22/2024 4.1 3.5 - 5.3 mmol/L Final   11/07/2023 4.7 3.5 - 5.3 mmol/L Final   05/22/2023 4.8 3.5 - 5.1 mmol/L Final     Chloride   Date Value Ref Range Status   09/11/2024 110 95 - 110 mmol/L Final   09/05/2024 108 95 - 110 mmol/L Final   05/22/2023 107 95 - 110 mmol/L Final     CO2   Date Value Ref Range Status   09/11/2024 22 (L) 23 - 29 mmol/L Final   09/05/2024 24 23 - 29 mmol/L Final   05/22/2023 23 23 - 29 mmol/L Final     Carbon Dioxide   Date Value Ref Range Status   04/22/2024 23 20 - 32 mmol/L Final   11/07/2023 26 20 - 32 mmol/L Final     BUN   Date Value Ref Range Status   09/11/2024 24 (H) 6 - 20 mg/dL Final   09/05/2024 28 (H) 6 - 20 mg/dL Final   05/22/2023 31 (H) 6 - 20 mg/dL Final     Blood Urea Nitrogen   Date Value Ref Range Status   04/22/2024 35 (H) 7 - 25 mg/dL Final   11/07/2023 34 (H) 7 - 25 mg/dL Final     Creatinine   Date Value Ref Range Status   09/11/2024 1.8  (H) 0.5 - 1.4 mg/dL Final   09/05/2024 1.8 (H) 0.5 - 1.4 mg/dL Final   04/22/2024 1.83 (H) 0.70 - 1.30 mg/dL Final   11/07/2023 1.77 (H) 0.70 - 1.30 mg/dL Final   05/22/2023 1.7 (H) 0.5 - 1.4 mg/dL Final     eGFR   Date Value Ref Range Status   09/11/2024 43.9 (A) >60 mL/min/1.73 m^2 Final   09/05/2024 44 (A) >60 mL/min/1.73 m^2 Final   04/22/2024 43 (L) > OR = 60 mL/min/1.73m2 Final   11/07/2023 45 (L) > OR = 60 mL/min/1.73m2 Final   05/22/2023 48 (A) >60 mL/min/1.73 m^2 Final     Glucose   Date Value Ref Range Status   07/01/2021 85 65 - 99 mg/dL Final   02/17/2021 135 (H) 65 - 99 mg/dL Final   10/16/2020 106 (H) 65 - 99 mg/dL Final     Calcium   Date Value Ref Range Status   09/11/2024 9.9 8.7 - 10.5 mg/dL Final   09/05/2024 9.3 8.7 - 10.5 mg/dL Final   04/22/2024 9.4 8.6 - 10.3 mg/dL Final   11/07/2023 9.2 8.6 - 10.3 mg/dL Final   05/22/2023 9.6 8.7 - 10.5 mg/dL Final     Phosphorus   Date Value Ref Range Status   09/05/2024 3.1 2.7 - 4.5 mg/dL Final   09/21/2020 3.1 2.7 - 4.5 mg/dL Final     Albumin   Date Value Ref Range Status   09/11/2024 4.0 3.5 - 5.2 g/dL Final   09/05/2024 3.6 3.5 - 5.2 g/dL Final   05/22/2023 4.3 3.5 - 5.2 g/dL Final     Albumin Level   Date Value Ref Range Status   04/22/2024 4.0 3.6 - 5.1 g/dL Final   11/07/2023 3.9 3.6 - 5.1 g/dL Final       PTH, Intact   Date Value Ref Range Status   09/05/2024 101.7 (H) 9.0 - 77.0 pg/mL Final   09/21/2020 61.2 9.0 - 77.0 pg/mL Final     Uric Acid   Date Value Ref Range Status   09/05/2024 6.9 3.4 - 7.0 mg/dL Final   09/21/2020 6.8 3.4 - 7.0 mg/dL Final       Hemoglobin   Date Value Ref Range Status   09/11/2024 13.6 (L) 14.0 - 18.0 g/dL Final   09/05/2024 13.3 (L) 14.0 - 18.0 g/dL Final   06/10/2024 13.1 (L) 14.0 - 18.0 g/dL Final       Prot/Creat Ratio, Urine   Date Value Ref Range Status   09/05/2024 0.45 (H) 0.00 - 0.20 Final   12/03/2021 0.56 (H) 0.00 - 0.20 Final   09/21/2020 0.36 (H) 0.00 - 0.20 Final         Renal US: ordered        Impression/Plan:    Stage 3 chronic kidney disease  - baseline Cr 1.6-1.8 with GFR 44-51% since 12/2021; clinically due to diabetic nephropathy  - Cr 1.8 today; stable and at baseline  - UPCR 0.45; acceptable. Continue Jardiance 10mg daily. Continue lisinopril 20mg daily for now; low threshold to increase dose if proteinuria worsens. No indication for Kerendia at this time; will reconsider if UPCR ever > 0.5  - good water intake. Low salt diet. Avoid NSAIDs.  - ordered renal US    Anemia in chronic kidney disease  - associated with KELI  - hgb 13.3 today; at goal for CKD  - continue to f/u with hem/onc    Secondary renal hyperparathyroidism  - ; acceptable. CCa and phos normal.  - no need for renal diet at this time; no need for protein restriction    Essential hypertension  - patient does not have HTN  - monitor for hypotension while on ACEi        Visit today included increased complexity associated with the care of the episodic problem secondary HPTH addressed and managing the longitudinal care of the patient due to the serious and/or complex managed problem(s) CKD.    Treatment options and plan were discussed with the patient and/or caregiver.   RTC 3 months.       Gale Hickman MD  [facility]  [phone]

## 2024-09-18 ENCOUNTER — OFFICE VISIT (OUTPATIENT)
Dept: NEPHROLOGY | Facility: CLINIC | Age: 54
End: 2024-09-18
Payer: MEDICARE

## 2024-09-18 VITALS
OXYGEN SATURATION: 97 % | RESPIRATION RATE: 18 BRPM | BODY MASS INDEX: 16.64 KG/M2 | SYSTOLIC BLOOD PRESSURE: 122 MMHG | DIASTOLIC BLOOD PRESSURE: 74 MMHG | HEIGHT: 63 IN | HEART RATE: 76 BPM | WEIGHT: 93.94 LBS

## 2024-09-18 DIAGNOSIS — I10 ESSENTIAL HYPERTENSION: ICD-10-CM

## 2024-09-18 DIAGNOSIS — N25.81 SECONDARY RENAL HYPERPARATHYROIDISM: ICD-10-CM

## 2024-09-18 DIAGNOSIS — N18.9 ANEMIA IN CHRONIC KIDNEY DISEASE, UNSPECIFIED CKD STAGE: ICD-10-CM

## 2024-09-18 DIAGNOSIS — N18.30 STAGE 3 CHRONIC KIDNEY DISEASE, UNSPECIFIED WHETHER STAGE 3A OR 3B CKD: Primary | ICD-10-CM

## 2024-09-18 DIAGNOSIS — D63.1 ANEMIA IN CHRONIC KIDNEY DISEASE, UNSPECIFIED CKD STAGE: ICD-10-CM

## 2024-09-18 PROCEDURE — 99999 PR PBB SHADOW E&M-EST. PATIENT-LVL III: CPT | Mod: PBBFAC,,, | Performed by: INTERNAL MEDICINE

## 2024-09-18 PROCEDURE — G2211 COMPLEX E/M VISIT ADD ON: HCPCS | Mod: S$PBB,,, | Performed by: INTERNAL MEDICINE

## 2024-09-18 PROCEDURE — 99213 OFFICE O/P EST LOW 20 MIN: CPT | Mod: PBBFAC | Performed by: INTERNAL MEDICINE

## 2024-09-18 PROCEDURE — 99204 OFFICE O/P NEW MOD 45 MIN: CPT | Mod: S$PBB,,, | Performed by: INTERNAL MEDICINE

## 2024-09-18 NOTE — ASSESSMENT & PLAN NOTE
- baseline Cr 1.6-1.8 with GFR 44-51% since 12/2021; clinically due to diabetic nephropathy  - Cr 1.8 today; stable and at baseline  - UPCR 0.45; acceptable. Continue Jardiance 10mg daily. Continue lisinopril 20mg daily for now; low threshold to increase dose if proteinuria worsens. No indication for Kerendia at this time; will reconsider if UPCR ever > 0.5  - good water intake. Low salt diet. Avoid NSAIDs.  - ordered renal US

## 2024-09-18 NOTE — ASSESSMENT & PLAN NOTE
- ; acceptable. CCa and phos normal.  - no need for renal diet at this time; no need for protein restriction

## 2024-09-19 ENCOUNTER — TELEPHONE (OUTPATIENT)
Dept: HEMATOLOGY/ONCOLOGY | Facility: CLINIC | Age: 54
End: 2024-09-19
Payer: MEDICARE

## 2024-10-10 ENCOUNTER — LAB VISIT (OUTPATIENT)
Dept: LAB | Facility: HOSPITAL | Age: 54
End: 2024-10-10
Attending: INTERNAL MEDICINE
Payer: MEDICARE

## 2024-10-10 DIAGNOSIS — R76.8 ELEVATED SERUM IMMUNOGLOBULIN FREE LIGHT CHAINS: ICD-10-CM

## 2024-10-10 DIAGNOSIS — D50.9 IRON DEFICIENCY ANEMIA, UNSPECIFIED IRON DEFICIENCY ANEMIA TYPE: ICD-10-CM

## 2024-10-10 LAB
BASOPHILS # BLD AUTO: 0.05 K/UL (ref 0–0.2)
BASOPHILS NFR BLD: 1.3 % (ref 0–1.9)
DIFFERENTIAL METHOD BLD: ABNORMAL
EOSINOPHIL # BLD AUTO: 0.1 K/UL (ref 0–0.5)
EOSINOPHIL NFR BLD: 2 % (ref 0–8)
ERYTHROCYTE [DISTWIDTH] IN BLOOD BY AUTOMATED COUNT: 12.5 % (ref 11.5–14.5)
FERRITIN SERPL-MCNC: 42 NG/ML (ref 20–300)
HCT VFR BLD AUTO: 38.7 % (ref 40–54)
HGB BLD-MCNC: 12.5 G/DL (ref 14–18)
IGA SERPL-MCNC: 147 MG/DL (ref 40–350)
IGG SERPL-MCNC: 1231 MG/DL (ref 650–1600)
IGM SERPL-MCNC: 49 MG/DL (ref 50–300)
IMM GRANULOCYTES # BLD AUTO: 0 K/UL (ref 0–0.04)
IMM GRANULOCYTES NFR BLD AUTO: 0 % (ref 0–0.5)
IRON SERPL-MCNC: 117 UG/DL (ref 45–160)
LYMPHOCYTES # BLD AUTO: 1.1 K/UL (ref 1–4.8)
LYMPHOCYTES NFR BLD: 26.6 % (ref 18–48)
MCH RBC QN AUTO: 33.2 PG (ref 27–31)
MCHC RBC AUTO-ENTMCNC: 32.3 G/DL (ref 32–36)
MCV RBC AUTO: 103 FL (ref 82–98)
MONOCYTES # BLD AUTO: 0.6 K/UL (ref 0.3–1)
MONOCYTES NFR BLD: 15.3 % (ref 4–15)
NEUTROPHILS # BLD AUTO: 2.2 K/UL (ref 1.8–7.7)
NEUTROPHILS NFR BLD: 54.8 % (ref 38–73)
NRBC BLD-RTO: 0 /100 WBC
PLATELET # BLD AUTO: 155 K/UL (ref 150–450)
PMV BLD AUTO: 11.2 FL (ref 9.2–12.9)
RBC # BLD AUTO: 3.77 M/UL (ref 4.6–6.2)
SATURATED IRON: 38 % (ref 20–50)
TOTAL IRON BINDING CAPACITY: 305 UG/DL (ref 250–450)
TRANSFERRIN SERPL-MCNC: 206 MG/DL (ref 200–375)
WBC # BLD AUTO: 3.98 K/UL (ref 3.9–12.7)

## 2024-10-10 PROCEDURE — 86334 IMMUNOFIX E-PHORESIS SERUM: CPT | Mod: 26,,, | Performed by: PATHOLOGY

## 2024-10-10 PROCEDURE — 85025 COMPLETE CBC W/AUTO DIFF WBC: CPT | Performed by: INTERNAL MEDICINE

## 2024-10-10 PROCEDURE — 82784 ASSAY IGA/IGD/IGG/IGM EACH: CPT | Mod: 59 | Performed by: INTERNAL MEDICINE

## 2024-10-10 PROCEDURE — 36415 COLL VENOUS BLD VENIPUNCTURE: CPT | Performed by: INTERNAL MEDICINE

## 2024-10-10 PROCEDURE — 84165 PROTEIN E-PHORESIS SERUM: CPT | Performed by: INTERNAL MEDICINE

## 2024-10-10 PROCEDURE — 82728 ASSAY OF FERRITIN: CPT | Performed by: INTERNAL MEDICINE

## 2024-10-10 PROCEDURE — 83540 ASSAY OF IRON: CPT | Performed by: INTERNAL MEDICINE

## 2024-10-10 PROCEDURE — 83521 IG LIGHT CHAINS FREE EACH: CPT | Performed by: INTERNAL MEDICINE

## 2024-10-10 PROCEDURE — 86334 IMMUNOFIX E-PHORESIS SERUM: CPT | Performed by: INTERNAL MEDICINE

## 2024-10-10 PROCEDURE — 84165 PROTEIN E-PHORESIS SERUM: CPT | Mod: 26,,, | Performed by: PATHOLOGY

## 2024-10-11 LAB
ALBUMIN SERPL ELPH-MCNC: 3.7 G/DL (ref 3.35–5.55)
ALPHA1 GLOB SERPL ELPH-MCNC: 0.26 G/DL (ref 0.17–0.41)
ALPHA2 GLOB SERPL ELPH-MCNC: 0.62 G/DL (ref 0.43–0.99)
B-GLOBULIN SERPL ELPH-MCNC: 0.61 G/DL (ref 0.5–1.1)
GAMMA GLOB SERPL ELPH-MCNC: 1.12 G/DL (ref 0.67–1.58)
INTERPRETATION SERPL IFE-IMP: NORMAL
KAPPA LC SER QL IA: 6.49 MG/DL (ref 0.33–1.94)
KAPPA LC/LAMBDA SER IA: 1.67 (ref 0.26–1.65)
LAMBDA LC SER QL IA: 3.88 MG/DL (ref 0.57–2.63)
PROT SERPL-MCNC: 6.3 G/DL (ref 6–8.4)

## 2024-10-16 LAB
PATHOLOGIST INTERPRETATION IFE: NORMAL
PATHOLOGIST INTERPRETATION SPE: NORMAL

## 2024-11-12 ENCOUNTER — OFFICE VISIT (OUTPATIENT)
Dept: HEMATOLOGY/ONCOLOGY | Facility: CLINIC | Age: 54
End: 2024-11-12
Payer: MEDICARE

## 2024-11-12 VITALS
DIASTOLIC BLOOD PRESSURE: 86 MMHG | BODY MASS INDEX: 16.68 KG/M2 | HEART RATE: 74 BPM | WEIGHT: 94.13 LBS | HEIGHT: 63 IN | SYSTOLIC BLOOD PRESSURE: 140 MMHG | OXYGEN SATURATION: 96 %

## 2024-11-12 DIAGNOSIS — D63.1 ANEMIA IN CHRONIC KIDNEY DISEASE, UNSPECIFIED CKD STAGE: Primary | ICD-10-CM

## 2024-11-12 DIAGNOSIS — N18.9 CHRONIC KIDNEY DISEASE, UNSPECIFIED CKD STAGE: ICD-10-CM

## 2024-11-12 DIAGNOSIS — R76.8 ELEVATED SERUM IMMUNOGLOBULIN FREE LIGHT CHAINS: ICD-10-CM

## 2024-11-12 DIAGNOSIS — N18.9 ANEMIA IN CHRONIC KIDNEY DISEASE, UNSPECIFIED CKD STAGE: Primary | ICD-10-CM

## 2024-11-12 PROCEDURE — 99214 OFFICE O/P EST MOD 30 MIN: CPT | Mod: PBBFAC | Performed by: INTERNAL MEDICINE

## 2024-11-12 PROCEDURE — 99999 PR PBB SHADOW E&M-EST. PATIENT-LVL IV: CPT | Mod: PBBFAC,,, | Performed by: INTERNAL MEDICINE

## 2024-11-12 PROCEDURE — 99213 OFFICE O/P EST LOW 20 MIN: CPT | Mod: S$PBB,,, | Performed by: INTERNAL MEDICINE

## 2024-11-12 NOTE — PROGRESS NOTES
Subjective:       Patient ID: Jarertt Odonnell Jr. is a 54 y.o. male.    Chief Complaint: Follow-up (Anemia)         Diagnosis: anemia      52-year-old gentleman with past medical history  medical history of Aidan-Beuren syndrome, , T2DM, CKD of type 2DM, hypertension seen today for f/u for KELI  He is accompanied by his mother.    No history of blood transfusions.   Appetite and weight stable.  No fevers or night sweats.  CBC from 8/24/2017 revealed a white blood cell count of 4100/MM 3 hemoglobin of 8.9 g/dL hematocrit of 28.6% MCV of 82.4 and a platelet count of 199K . S/p Injectafer completed 9/2017    Interval Hx; Accompanied by mother  No new issues  No fatigue  Pt intolerant of Fe supp   Appetite and weight stable  No SOB/CP  No melena, hematochezia or change in bowel habits      He is followed at outside facility for adrenal lesion  Recent imaging outside facility reveals probable adrenal adenoma        hemoglobin of  12.5 g/dL on 10/10/24      Pt s/p GI eval 10/2017  EGD- nl esophagus, gastric polyp  Colonoscopy -[facility], o/w unremarkable        PAST MEDICAL HISTORY type 2 diabetes mellitus with diabetic chronic kidney disease, hypertensive chronic kidney disease, chronic kidney disease stage III      PAST SURGICAL HISTORY : Orchiectomy     SOCIAL HISTORY : No  history of tobacco use.  No history of ETOH use.  He lives with his parents.  He attends [employer].  No smoke or alcohol use      Review of Systems   Constitutional:  Negative for appetite change, fatigue, fever and unexpected weight change.   HENT:  Negative for mouth sores.    Eyes:  Negative for visual disturbance.   Respiratory:  Negative for cough and shortness of breath.    Cardiovascular:  Negative for chest pain.   Gastrointestinal:  Negative for abdominal pain and diarrhea.   Genitourinary:  Negative for frequency.   Musculoskeletal:  Negative for back pain.   Skin:  Negative for rash.   Neurological:  Negative for headaches.  "  Hematological:  Negative for adenopathy.   Psychiatric/Behavioral:  The patient is not nervous/anxious.        Objective:       Vitals:    11/12/24 1441   BP: (!) 140/86   BP Location: Right arm   Patient Position: Sitting   Pulse: 74   SpO2: 96%   Weight: 42.7 kg (94 lb 2.2 oz)   Height: 5' 3" (1.6 m)           Physical Exam  Constitutional:       Appearance: He is well-developed.   HENT:      Head: Normocephalic.   Eyes:      General: No scleral icterus.     Conjunctiva/sclera: Conjunctivae normal.   Cardiovascular:      Rate and Rhythm: Normal rate and regular rhythm.      Heart sounds: Normal heart sounds. No murmur heard.  Pulmonary:      Effort: Pulmonary effort is normal.      Breath sounds: Normal breath sounds. No wheezing or rales.   Abdominal:      General: Bowel sounds are normal. There is no distension.      Palpations: Abdomen is soft. There is no mass.      Tenderness: There is no abdominal tenderness. There is no guarding or rebound.   Musculoskeletal:         General: Normal range of motion.      Cervical back: Normal range of motion and neck supple.   Skin:     Findings: No erythema or rash.   Neurological:      Mental Status: He is alert and oriented to person, place, and time.      Cranial Nerves: No cranial nerve deficit.         Outside Facility labs Reviewed.  8/24/2017 BUN 17 Creatinine 1.27 Sodium 140 Potassium 3.7 Chloride 106 Calcium 8.7 Phosphorous 2.3 Hemoglobin 8.9 Hematocrit 28.6% plt ct t 190K from   3/7/20/17 Hemoglobin 10.3 g/dL  Hematocrit 31.7%    SPEP-nl    Results for JOHN MCGRATH JR. (MRN [id number]) as of 9/15/2017 10:06   Ref. Range 8/28/2017 12:00   Daykin Free Light Chains Latest Ref Range: 0.33 - 1.94 mg/dL 4.70 (H)   Lambda Free Light Chains Latest Ref Range: 0.57 - 2.63 mg/dL 3.35 (H)   Kappa/Lambda FLC Ratio Latest Ref Range: 0.26 - 1.65  1.40     Lab Results   Component Value Date    IRON 117 10/10/2024    TIBC 305 10/10/2024    FERRITIN 42 10/10/2024     Results for " JOHN MCGRATH JR. (MRN [id number]) as of 9/15/2017 10:06   Ref. Range 8/28/2017 12:00   Copper Latest Ref Range: 665 - 1480 ug/L 1239   Differential Method Unknown Automated   RBC Folate Latest Ref Range: 280 - 791 ng/mL 696   Zinc, Serum-ALT Latest Ref Range: 60 - 130 ug/dL 62     Lab Results   Component Value Date    WBC 3.98 10/10/2024    HGB 12.5 (L) 10/10/2024    HCT 38.7 (L) 10/10/2024     (H) 10/10/2024     10/10/2024     Lab Results   Component Value Date    IRON 117 10/10/2024    TIBC 305 10/10/2024    FERRITIN 42 10/10/2024     Results for TRESSA MCGRATH JR. (MRN [id number]) as of 9/26/2019 13:55   Ref. Range 9/9/2019 15:10   Protein, Serum Latest Ref Range: 6.0 - 8.4 g/dL 6.4   RBC Folate Latest Ref Range: 280 - 791 ng/mL 697   Zinc, Serum-ALT Latest Ref Range: 60 - 130 ug/dL 46 (L)       Results for TRESSA MCGRATH JR. (MRN [id number]) as of 1/6/2020 12:46   Ref. Range 9/9/2019 15:10 12/30/2019 15:53   Zinc, Serum-ALT Latest Ref Range: 60 - 130 ug/dL 46 (L) 45 (L)       5/2019 Outside Facility [facility]  Indication: Dilatation of abdominal aorta.    Prior studies reviewed: None available.  Procedure: One or more of the following dose lowering techniques were utilized: Automated exposure control, iterative reconstruction technique, and/or adjustment of the mA and kV according to patient size.   Contiguous 1 mm images were obtained through the abdominal aorta and the pelvic vessels down through the bifurcation of the common femoral arteries. Additional 1 mm images were obtained after 100 ml intravenous contrast. The raw data was then sent to a separate post processing workstation where 3-D MIP images were obtained and vessel analysis was performed.    Findings: The abdominal aorta is normal in caliber. There is no significant atherosclerotic disease. No stenosis or aneurysm is seen.    The celiac, SMA and KAREN are patent. The renal arteries are also patent.    In the pelvis, the iliac vessels and the  common femoral artery are patent.    Other CT findings include a 1.5 cm rounded lesion in the right adrenal gland, most likely an adenoma. In the pelvis, there was a small amount of free fluid.    IMPRESSION:   1. No significant vascular disease is seen in the abdomen or pelvis. There is no atherosclerosis or aneurysm.  2. Probable adenoma in the right adrenal gland.  3. There is a small amount of free fluid in the pelvis, of uncertain etiology.               SPEP 5/22/23  Signed on 05/24/23 at 12:12   Normal total protein, normal pattern.     [facility] 3/28/24  Ferritin 7  Fe 96   TIBC 341     Hb 11.4g/dl  Hct 34.2%    Plt 164     Lab Results   Component Value Date    WBC 3.98 10/10/2024    HGB 12.5 (L) 10/10/2024    HCT 38.7 (L) 10/10/2024     (H) 10/10/2024     10/10/2024     Lab Results   Component Value Date    IRON 117 10/10/2024    TRANSFERRIN 206 10/10/2024    TIBC 305 10/10/2024    FESATURATED 38 10/10/2024              Assessment:       1. Anemia in chronic kidney disease, unspecified CKD stage    2. Elevated serum immunoglobulin free light chains    3. Chronic kidney disease, unspecified CKD stage            Plan:   1. 53 -year-old with history type 2 diabetes mellitus with diabetic chronic kidney disease,Chronic kidney disease stage III   Pt has a mild anemia dating back to at least 2016   CKD likely contributing factor  Has hx of iron def -recent Fe parameters wnl   Hb 12.5  g/dL   Ferritin 42  S/p GI eval ( Dr. Rodriguez) 2019?- no bleeding source  CBC and FE studies in 4mos     2. MGUS with renal impairment  [facility] labs reviewed  No signs of disease progression  Monitor  Elevated serum FLC likely 2/2 renal impairment     3. Cr level 1.8 to 1.6mg/dL   Recent Cr level 1.6mg/dL on 10/27/24   Follow-up with Nephrology        Visit today included increased complexity associated with the care of the episodic problem anemia in CKD  addressed and managing the longitudinal care of the patient due to  the serious and/or complex managed problem(s) anemia in CKD     Follow up with PCP for med mgmt    CBC and FE studies in 4 mos    Cc Emil Montejo M.D.        Alpesh Rodriguez II, MD

## 2024-12-18 ENCOUNTER — HOSPITAL ENCOUNTER (OUTPATIENT)
Dept: RADIOLOGY | Facility: HOSPITAL | Age: 54
Discharge: HOME | End: 2024-12-18
Attending: INTERNAL MEDICINE
Payer: MEDICARE

## 2024-12-18 DIAGNOSIS — N25.81 SECONDARY RENAL HYPERPARATHYROIDISM: ICD-10-CM

## 2024-12-18 DIAGNOSIS — N18.9 ANEMIA IN CHRONIC KIDNEY DISEASE, UNSPECIFIED CKD STAGE: ICD-10-CM

## 2024-12-18 DIAGNOSIS — D63.1 ANEMIA IN CHRONIC KIDNEY DISEASE, UNSPECIFIED CKD STAGE: ICD-10-CM

## 2024-12-18 DIAGNOSIS — N18.30 STAGE 3 CHRONIC KIDNEY DISEASE, UNSPECIFIED WHETHER STAGE 3A OR 3B CKD: ICD-10-CM

## 2024-12-18 DIAGNOSIS — I10 ESSENTIAL HYPERTENSION: ICD-10-CM

## 2024-12-18 PROCEDURE — 76770 US EXAM ABDO BACK WALL COMP: CPT | Mod: TC

## 2024-12-18 PROCEDURE — 76770 US EXAM ABDO BACK WALL COMP: CPT | Mod: 26,,, | Performed by: RADIOLOGY

## 2024-12-19 ENCOUNTER — OFFICE VISIT (OUTPATIENT)
Dept: NEPHROLOGY | Facility: CLINIC | Age: 54
End: 2024-12-19
Payer: MEDICARE

## 2024-12-19 VITALS
HEIGHT: 63 IN | WEIGHT: 94.81 LBS | OXYGEN SATURATION: 97 % | SYSTOLIC BLOOD PRESSURE: 132 MMHG | BODY MASS INDEX: 16.8 KG/M2 | DIASTOLIC BLOOD PRESSURE: 80 MMHG | RESPIRATION RATE: 18 BRPM | HEART RATE: 75 BPM

## 2024-12-19 DIAGNOSIS — D63.1 ANEMIA IN CHRONIC KIDNEY DISEASE, UNSPECIFIED CKD STAGE: ICD-10-CM

## 2024-12-19 DIAGNOSIS — N25.81 SECONDARY RENAL HYPERPARATHYROIDISM: ICD-10-CM

## 2024-12-19 DIAGNOSIS — N18.32 STAGE 3B CHRONIC KIDNEY DISEASE: Primary | ICD-10-CM

## 2024-12-19 DIAGNOSIS — N18.9 ANEMIA IN CHRONIC KIDNEY DISEASE, UNSPECIFIED CKD STAGE: ICD-10-CM

## 2024-12-19 DIAGNOSIS — I10 ESSENTIAL HYPERTENSION: ICD-10-CM

## 2024-12-19 PROCEDURE — 99999 PR PBB SHADOW E&M-EST. PATIENT-LVL IV: CPT | Mod: PBBFAC,,, | Performed by: INTERNAL MEDICINE

## 2024-12-19 PROCEDURE — 99214 OFFICE O/P EST MOD 30 MIN: CPT | Mod: PBBFAC | Performed by: INTERNAL MEDICINE

## 2024-12-19 NOTE — ASSESSMENT & PLAN NOTE
- baseline Cr 1.6-1.8 with GFR 44-51% since 12/2021; clinically due to diabetic nephropathy  - Cr 1.8 --> 1.9 today; stable.   - UPCR 0.45 --> 0.42; stable. Continue lisinopril 20mg daily and Jardiance 10mg daily. Low threshold to increase lisinopril dose if proteinuria ever worsens  - renal US c/w advanced CKD and mild caliectasis. No report of urinary retention. Will repeat US if Cr worsens  - good water intake. Low salt diet. Avoid NSAIDs.

## 2024-12-19 NOTE — PROGRESS NOTES
"[facility]  [address] [facility] [address], [address]  [place]  [phone]    PCP: Fabián John MD  Hem/Onc: Lorna   Cardiology: Rose    HPI: Jarrett Odonnell Jr. is a 54 y.o. male with Aidan syndrome, T2DM, CKD, DLD, and BPH who is here for established patient evaluation of Chronic Kidney Disease  Initial visit was 9/18/24. His baseline Cr has been 1.6-1.8 with GFR 44-51% since 12/2021. Cr 1.8 today. UA with proteinuria since at least 2020. He is currently on lisinopril 20mg daily and Jardiance 10mg daily.   He was diagnosed with T2DM in 2000. Mother recalls his glucose being in the 700s around time of diagnosis. Glucose has since been well-controlled. Chart review notable for A1c </= 6% since at least 2020.   He denies h/o HTN; only on lisinopril for diabetic nephropathy.  No h/o gout, kidney stones, or significant NSAID use.   His anemia is managed by Dr. Rothman. He was started on ferrous sulfate which caused increased BM (up to 4 q AM).   Mother provides most of history.     Interval Hx:   LV 9/18/24: no medication changes.  Today he reports to be doing well. Notes BP goes up and down and seems to be related to certain foods. Follows a diabetic diet; avoids foods like pizza and candy. No leg swelling.       ROS:  Complete ROS otherwise negative except as indicated above.         Current Outpatient Medications:     atorvastatin (LIPITOR) 10 MG tablet, Take 10 mg by mouth once daily., Disp: , Rfl:     BD AMANDA 2ND GEN PEN NEEDLE 32 gauge x 5/32" Ndle, USE FOUR TIMES DAILY, Disp: 100 each, Rfl: 4    blood sugar diagnostic (BLOOD GLUCOSE TEST) Strp, by Misc.(Non-Drug; Combo Route) route. I strip 4 times a day., Disp: , Rfl:     fluocinolone acetonide oiL 0.01 % Drop, Place 1 drop into both ears as needed. Instill 4-5 drops into both ears twice a week for 2 months until follow up appointment., Disp: , Rfl:     insulin aspart U-100 (NOVOLOG) 100 unit/mL injection, Novolog U-100 Insulin aspart 100 unit/mL " "subcutaneous solution  USE ON SLIDING SCALE AS DIRECTED MAXIMUM OF SIX UNITS, Disp: , Rfl:     insulin syringe-needle U-100 0.5 mL 29 gauge x 1/2" Syrg, Inject 8 Units/day into the muscle once daily. Inject 8 units into the skin every morning., Disp: , Rfl:     JANUVIA 100 mg Tab, Take 50 mg by mouth once daily., Disp: , Rfl:     JARDIANCE 10 mg tablet, Take 10 mg by mouth once daily., Disp: , Rfl:     lisinopril (PRINIVIL,ZESTRIL) 5 MG tablet, Take 20 mg by mouth once daily., Disp: , Rfl:     nateglinide (STARLIX) 60 MG tablet, 120 mg. Take 1 tablet in the morning and 1 tablet at noon and 1 tablet in the evening by mouth., Disp: , Rfl:     omeprazole (PRILOSEC) 20 MG capsule, Take 20 mg by mouth every morning., Disp: , Rfl:     pen needle, diabetic 32 gauge x 5/32" Ndle, BD Ultra-Fine Lauren Pen Needle 32 gauge x 5/32", Disp: , Rfl:     simethicone (MYLICON) 125 MG chewable tablet, Take 125 mg by mouth every 6 (six) hours as needed for Flatulence., Disp: , Rfl:     triamcinolone acetonide 0.1% (KENALOG) 0.1 % ointment, Apply 1 Tube topically as needed., Disp: , Rfl:     ferrous sulfate (FEOSOL) 325 mg (65 mg iron) Tab tablet, TAKE 1 TABLET BY MOUTH EVERY DAY (Patient not taking: Reported on 12/19/2024), Disp: 90 tablet, Rfl: 0    tamsulosin (FLOMAX) 0.4 mg Cap, Take 1 capsule by mouth once daily. (Patient not taking: Reported on 12/19/2024), Disp: , Rfl:       Vitals: Blood pressure 132/80, pulse 75, resp. rate 18, height 5' 3" (1.6 m), weight 43 kg (94 lb 12.8 oz), SpO2 97%. Body mass index is 16.79 kg/m².    Physical Exam  Vitals reviewed.   Constitutional:       General: He is awake. He is not in acute distress.     Appearance: Normal appearance. He is well-developed.   HENT:      Head: Normocephalic and atraumatic.      Nose: Nose normal.      Mouth/Throat:      Mouth: Mucous membranes are moist.   Eyes:      Extraocular Movements: Extraocular movements intact.      Conjunctiva/sclera: Conjunctivae normal. "   Pulmonary:      Effort: Pulmonary effort is normal.   Musculoskeletal:         General: No tenderness or signs of injury.      Right lower leg: No edema.      Left lower leg: No edema.   Skin:     General: Skin is warm and dry.      Findings: No erythema or rash.   Neurological:      General: No focal deficit present.      Mental Status: He is alert. Mental status is at baseline.   Psychiatric:         Mood and Affect: Mood normal.         Behavior: Behavior normal.           Labs/Imaging:  Sodium   Date Value Ref Range Status   12/18/2024 140 136 - 145 mmol/L Final   10/17/2024 140 135 - 146 mmol/L Final   09/11/2024 143 136 - 145 mmol/L Final   09/05/2024 140 136 - 145 mmol/L Final     Potassium   Date Value Ref Range Status   12/18/2024 3.9 3.5 - 5.1 mmol/L Final   10/17/2024 4.8 3.5 - 5.3 mmol/L Final   09/11/2024 4.0 3.5 - 5.1 mmol/L Final   09/05/2024 4.1 3.5 - 5.1 mmol/L Final     Chloride   Date Value Ref Range Status   12/18/2024 108 95 - 110 mmol/L Final   09/11/2024 110 95 - 110 mmol/L Final   09/05/2024 108 95 - 110 mmol/L Final     CO2   Date Value Ref Range Status   12/18/2024 25 23 - 29 mmol/L Final   09/11/2024 22 (L) 23 - 29 mmol/L Final   09/05/2024 24 23 - 29 mmol/L Final     Carbon Dioxide   Date Value Ref Range Status   10/17/2024 23 20 - 32 mmol/L Final     BUN   Date Value Ref Range Status   12/18/2024 32 (H) 6 - 20 mg/dL Final   09/11/2024 24 (H) 6 - 20 mg/dL Final   09/05/2024 28 (H) 6 - 20 mg/dL Final     Blood Urea Nitrogen   Date Value Ref Range Status   10/17/2024 32 (H) 7 - 25 mg/dL Final     Creatinine   Date Value Ref Range Status   12/18/2024 1.9 (H) 0.5 - 1.4 mg/dL Final   10/17/2024 1.67 (H) 0.70 - 1.30 mg/dL Final   09/11/2024 1.8 (H) 0.5 - 1.4 mg/dL Final   09/05/2024 1.8 (H) 0.5 - 1.4 mg/dL Final     eGFR   Date Value Ref Range Status   12/18/2024 41 (A) >60 mL/min/1.73 m^2 Final   10/17/2024 48 (L) > OR = 60 mL/min/1.73m2 Final   09/11/2024 43.9 (A) >60 mL/min/1.73 m^2 Final    09/05/2024 44 (A) >60 mL/min/1.73 m^2 Final     Glucose   Date Value Ref Range Status   07/01/2021 85 65 - 99 mg/dL Final   02/17/2021 135 (H) 65 - 99 mg/dL Final   10/16/2020 106 (H) 65 - 99 mg/dL Final     Calcium   Date Value Ref Range Status   12/18/2024 9.2 8.7 - 10.5 mg/dL Final   10/17/2024 9.4 8.6 - 10.3 mg/dL Final   09/11/2024 9.9 8.7 - 10.5 mg/dL Final   09/05/2024 9.3 8.7 - 10.5 mg/dL Final     Phosphorus   Date Value Ref Range Status   12/18/2024 3.3 2.7 - 4.5 mg/dL Final   09/05/2024 3.1 2.7 - 4.5 mg/dL Final   09/21/2020 3.1 2.7 - 4.5 mg/dL Final     Albumin   Date Value Ref Range Status   12/18/2024 3.7 3.5 - 5.2 g/dL Final   09/11/2024 4.0 3.5 - 5.2 g/dL Final   09/05/2024 3.6 3.5 - 5.2 g/dL Final     Albumin Level   Date Value Ref Range Status   10/17/2024 4.2 3.6 - 5.1 g/dL Final       PTH, Intact   Date Value Ref Range Status   12/18/2024 81.5 (H) 9.0 - 77.0 pg/mL Final   09/05/2024 101.7 (H) 9.0 - 77.0 pg/mL Final   09/21/2020 61.2 9.0 - 77.0 pg/mL Final     Uric Acid   Date Value Ref Range Status   12/18/2024 6.7 3.4 - 7.0 mg/dL Final   09/05/2024 6.9 3.4 - 7.0 mg/dL Final   09/21/2020 6.8 3.4 - 7.0 mg/dL Final       Hemoglobin   Date Value Ref Range Status   12/18/2024 12.5 (L) 14.0 - 18.0 g/dL Final   10/10/2024 12.5 (L) 14.0 - 18.0 g/dL Final   09/11/2024 13.6 (L) 14.0 - 18.0 g/dL Final       Prot/Creat Ratio, Urine   Date Value Ref Range Status   12/18/2024 0.42 (H) 0.00 - 0.20 Final   09/05/2024 0.45 (H) 0.00 - 0.20 Final   12/03/2021 0.56 (H) 0.00 - 0.20 Final           Renal US: 12/18/24:   Right kidney: The right kidney measures 8.8 cm. No cortical thinning or loss of corticomedullary distinction. Resistive index measures 0.72.  Mild caliectasis.  No solid mass or stone.     Left kidney: The left kidney measures 8.2 cm. No cortical thinning or loss of corticomedullary distinction.  Resistive index measures 0.62.  Mild caliectasis.  No stones or solid mass.       Impression:  Mild  bilateral renal caliectasis.      Impression/Plan:    Stage 3b chronic kidney disease  - baseline Cr 1.6-1.8 with GFR 44-51% since 12/2021; clinically due to diabetic nephropathy  - Cr 1.8 --> 1.9 today; stable.   - UPCR 0.45 --> 0.42; stable. Continue lisinopril 20mg daily and Jardiance 10mg daily. Low threshold to increase lisinopril dose if proteinuria ever worsens  - renal US c/w advanced CKD and mild caliectasis. No report of urinary retention. Will repeat US if Cr worsens  - good water intake. Low salt diet. Avoid NSAIDs.    Anemia in chronic kidney disease  - associated with KELI  - hgb 13.3 --> 12.5; at goal for CKD  - continue to f/u with hem/onc    Secondary renal hyperparathyroidism  -  --> 81; stable. CCa and phos normal. Will trend.     Essential hypertension  - patient does not have HTN  - monitor for hypotension while on ACEi           Visit today included increased complexity associated with the care of the episodic problem anemia addressed and managing the longitudinal care of the patient due to the serious and/or complex managed problem(s) CKD.      Treatment options and plan were discussed with the patient and/or caregiver.   RTC 4-5 months.       Gale Hickman MD  [facility]  [phone]

## 2025-02-25 ENCOUNTER — TELEPHONE (OUTPATIENT)
Dept: NEPHROLOGY | Facility: CLINIC | Age: 55
End: 2025-02-25
Payer: MEDICARE

## 2025-02-25 NOTE — TELEPHONE ENCOUNTER
I left a message for this patient's mother in reference of setting up a six month follow up with Dr. Hickman in nephrology. Instructed to contact the office at [phone].

## 2025-03-06 ENCOUNTER — TELEPHONE (OUTPATIENT)
Dept: NEPHROLOGY | Facility: CLINIC | Age: 55
End: 2025-03-06
Payer: MEDICARE

## 2025-03-06 NOTE — TELEPHONE ENCOUNTER
I left a message for this patient in reference of scheduling a six month follow up with Dr. Green. Instructed to contact the office at [phone].

## 2025-03-07 ENCOUNTER — TELEPHONE (OUTPATIENT)
Dept: NEPHROLOGY | Facility: CLINIC | Age: 55
End: 2025-03-07
Payer: MEDICARE

## 2025-03-07 NOTE — TELEPHONE ENCOUNTER
I spoke with this patient's mother in reference of scheduling a six month follow up with you. She states that he is preparing for a colonoscopy because he has been having some stomach pain. He has had one in the past and remembers every detail, so of course he is terrified. The mother went home and viewed the nurse practitioner notes at home, and feel that some information was not correct. Now she doesn't know if she should put him through this. I know that this may not be your area, but any advice?

## 2025-03-12 ENCOUNTER — LAB VISIT (OUTPATIENT)
Dept: LAB | Facility: HOSPITAL | Age: 55
End: 2025-03-12
Attending: INTERNAL MEDICINE
Payer: MEDICARE

## 2025-03-12 DIAGNOSIS — D63.1 ANEMIA IN CHRONIC KIDNEY DISEASE, UNSPECIFIED CKD STAGE: ICD-10-CM

## 2025-03-12 DIAGNOSIS — N18.9 ANEMIA IN CHRONIC KIDNEY DISEASE, UNSPECIFIED CKD STAGE: ICD-10-CM

## 2025-03-12 LAB
BASOPHILS # BLD AUTO: 0.04 K/UL (ref 0–0.2)
BASOPHILS NFR BLD: 0.9 % (ref 0–1.9)
DIFFERENTIAL METHOD BLD: ABNORMAL
EOSINOPHIL # BLD AUTO: 0.1 K/UL (ref 0–0.5)
EOSINOPHIL NFR BLD: 2.9 % (ref 0–8)
ERYTHROCYTE [DISTWIDTH] IN BLOOD BY AUTOMATED COUNT: 12.6 % (ref 11.5–14.5)
FERRITIN SERPL-MCNC: 16 NG/ML (ref 20–300)
HCT VFR BLD AUTO: 37.7 % (ref 40–54)
HGB BLD-MCNC: 12.4 G/DL (ref 14–18)
IMM GRANULOCYTES # BLD AUTO: 0.01 K/UL (ref 0–0.04)
IMM GRANULOCYTES NFR BLD AUTO: 0.2 % (ref 0–0.5)
IRON SERPL-MCNC: 88 UG/DL (ref 45–160)
LYMPHOCYTES # BLD AUTO: 1 K/UL (ref 1–4.8)
LYMPHOCYTES NFR BLD: 22.4 % (ref 18–48)
MCH RBC QN AUTO: 33.5 PG (ref 27–31)
MCHC RBC AUTO-ENTMCNC: 32.9 G/DL (ref 32–36)
MCV RBC AUTO: 102 FL (ref 82–98)
MONOCYTES # BLD AUTO: 0.6 K/UL (ref 0.3–1)
MONOCYTES NFR BLD: 13.2 % (ref 4–15)
NEUTROPHILS # BLD AUTO: 2.8 K/UL (ref 1.8–7.7)
NEUTROPHILS NFR BLD: 60.4 % (ref 38–73)
NRBC BLD-RTO: 0 /100 WBC
PLATELET # BLD AUTO: 169 K/UL (ref 150–450)
PMV BLD AUTO: 12 FL (ref 9.2–12.9)
RBC # BLD AUTO: 3.7 M/UL (ref 4.6–6.2)
SATURATED IRON: 23 % (ref 20–50)
TOTAL IRON BINDING CAPACITY: 383 UG/DL (ref 250–450)
TRANSFERRIN SERPL-MCNC: 259 MG/DL (ref 200–375)
WBC # BLD AUTO: 4.55 K/UL (ref 3.9–12.7)

## 2025-03-12 PROCEDURE — 82728 ASSAY OF FERRITIN: CPT | Performed by: INTERNAL MEDICINE

## 2025-03-12 PROCEDURE — 83540 ASSAY OF IRON: CPT | Performed by: INTERNAL MEDICINE

## 2025-03-12 PROCEDURE — 85025 COMPLETE CBC W/AUTO DIFF WBC: CPT | Performed by: INTERNAL MEDICINE

## 2025-03-12 PROCEDURE — 36415 COLL VENOUS BLD VENIPUNCTURE: CPT | Performed by: INTERNAL MEDICINE

## 2025-03-18 ENCOUNTER — OFFICE VISIT (OUTPATIENT)
Dept: HEMATOLOGY/ONCOLOGY | Facility: CLINIC | Age: 55
End: 2025-03-18
Payer: MEDICARE

## 2025-03-18 VITALS
BODY MASS INDEX: 16.57 KG/M2 | DIASTOLIC BLOOD PRESSURE: 87 MMHG | HEIGHT: 63 IN | HEART RATE: 71 BPM | TEMPERATURE: 98 F | WEIGHT: 93.5 LBS | SYSTOLIC BLOOD PRESSURE: 128 MMHG

## 2025-03-18 DIAGNOSIS — N18.9 CHRONIC KIDNEY DISEASE, UNSPECIFIED CKD STAGE: ICD-10-CM

## 2025-03-18 DIAGNOSIS — D50.9 IRON DEFICIENCY ANEMIA, UNSPECIFIED IRON DEFICIENCY ANEMIA TYPE: Primary | ICD-10-CM

## 2025-03-18 DIAGNOSIS — R76.8 ELEVATED SERUM IMMUNOGLOBULIN FREE LIGHT CHAINS: ICD-10-CM

## 2025-03-18 PROCEDURE — 99214 OFFICE O/P EST MOD 30 MIN: CPT | Mod: PBBFAC | Performed by: INTERNAL MEDICINE

## 2025-03-18 PROCEDURE — 99213 OFFICE O/P EST LOW 20 MIN: CPT | Mod: S$PBB,,, | Performed by: INTERNAL MEDICINE

## 2025-03-18 PROCEDURE — 99999 PR PBB SHADOW E&M-EST. PATIENT-LVL IV: CPT | Mod: PBBFAC,,, | Performed by: INTERNAL MEDICINE

## 2025-03-18 RX ORDER — HEPARIN 100 UNIT/ML
500 SYRINGE INTRAVENOUS
OUTPATIENT
Start: 2025-03-25

## 2025-03-18 RX ORDER — DIPHENHYDRAMINE HYDROCHLORIDE 50 MG/ML
50 INJECTION, SOLUTION INTRAMUSCULAR; INTRAVENOUS ONCE AS NEEDED
OUTPATIENT
Start: 2025-03-25

## 2025-03-18 RX ORDER — EPINEPHRINE 0.3 MG/.3ML
0.3 INJECTION SUBCUTANEOUS ONCE AS NEEDED
OUTPATIENT
Start: 2025-03-25

## 2025-03-18 RX ORDER — SODIUM CHLORIDE 0.9 % (FLUSH) 0.9 %
10 SYRINGE (ML) INJECTION
OUTPATIENT
Start: 2025-03-25

## 2025-03-18 NOTE — PROGRESS NOTES
Subjective:       Patient ID: Jarrett Odonnell Jr. is a 54 y.o. male.    Chief Complaint: No chief complaint on file.         Diagnosis: anemia      54-year-old gentleman with past medical history  medical history of Aidan-Beuren syndrome, , T2DM, CKD of type 2DM, hypertension seen today for f/u for KELI  He is accompanied by his mother.    No history of blood transfusions.   Appetite and weight stable.  No fevers or night sweats.  CBC from 8/24/2017 revealed a white blood cell count of 4100/MM 3 hemoglobin of 8.9 g/dL hematocrit of 28.6% MCV of 82.4 and a platelet count of 199K . S/p Injectafer completed 9/2017    Interval Hx; Accompanied by mother  He reports constipation past few mos  Also change in stool diameter  Pt intolerant of Fe supp   Appetite and weight stable  No SOB/CP  No melena, hematochezia or change in bowel habits      He is followed at outside facility for adrenal lesion  Recent imaging outside facility reveals probable adrenal adenoma        hemoglobin of  12.4 g/dL on 3/12/25      Pt s/p GI eval 10/2017  EGD- nl esophagus, gastric polyp  Colonoscopy -[facility], o/w unremarkable        PAST MEDICAL HISTORY type 2 diabetes mellitus with diabetic chronic kidney disease, hypertensive chronic kidney disease, chronic kidney disease stage III      PAST SURGICAL HISTORY : Orchiectomy     SOCIAL HISTORY : No  history of tobacco use.  No history of ETOH use.  He lives with his parents.  He attends [employer].  No smoke or alcohol use      Review of Systems   Constitutional:  Negative for appetite change, fatigue, fever and unexpected weight change.   HENT:  Negative for mouth sores.    Eyes:  Negative for visual disturbance.   Respiratory:  Negative for cough and shortness of breath.    Cardiovascular:  Negative for chest pain.   Gastrointestinal:  Positive for constipation. Negative for abdominal pain and diarrhea.   Genitourinary:  Negative for frequency.   Musculoskeletal:  Negative for back  "pain.   Skin:  Negative for rash.   Neurological:  Negative for headaches.   Hematological:  Negative for adenopathy.   Psychiatric/Behavioral:  The patient is not nervous/anxious.        Objective:       Vitals:    03/18/25 1515   BP: 128/87   BP Location: Left arm   Patient Position: Sitting   Pulse: 71   Temp: 97.9 °F (36.6 °C)   SpO2: (P) 98%   Weight: 42.4 kg (93 lb 7.6 oz)   Height: 5' 3" (1.6 m)           Physical Exam  Constitutional:       Appearance: He is well-developed.   HENT:      Head: Normocephalic.   Eyes:      General: No scleral icterus.     Conjunctiva/sclera: Conjunctivae normal.   Cardiovascular:      Rate and Rhythm: Normal rate and regular rhythm.      Heart sounds: Normal heart sounds. No murmur heard.  Pulmonary:      Effort: Pulmonary effort is normal.      Breath sounds: Normal breath sounds. No wheezing or rales.   Abdominal:      General: Bowel sounds are normal. There is no distension.      Palpations: Abdomen is soft. There is no mass.      Tenderness: There is no abdominal tenderness. There is no guarding or rebound.   Musculoskeletal:         General: Normal range of motion.      Cervical back: Normal range of motion and neck supple.   Skin:     Findings: No erythema or rash.   Neurological:      Mental Status: He is alert and oriented to person, place, and time.      Cranial Nerves: No cranial nerve deficit.         Outside Facility labs Reviewed.  8/24/2017 BUN 17 Creatinine 1.27 Sodium 140 Potassium 3.7 Chloride 106 Calcium 8.7 Phosphorous 2.3 Hemoglobin 8.9 Hematocrit 28.6% plt ct t 190K from   3/7/20/17 Hemoglobin 10.3 g/dL  Hematocrit 31.7%    SPEP-nl    Results for JOHN MCGRATH JR. (MRN [id number]) as of 9/15/2017 10:06   Ref. Range 8/28/2017 12:00   [place] Free Light Chains Latest Ref Range: 0.33 - 1.94 mg/dL 4.70 (H)   Lambda Free Light Chains Latest Ref Range: 0.57 - 2.63 mg/dL 3.35 (H)   Kappa/Lambda FLC Ratio Latest Ref Range: 0.26 - 1.65  1.40     Lab Results "   Component Value Date    IRON 88 03/12/2025    TIBC 383 03/12/2025    FERRITIN 16 (L) 03/12/2025     Results for JOHN MCGRATH JR. (MRN [id number]) as of 9/15/2017 10:06   Ref. Range 8/28/2017 12:00   Copper Latest Ref Range: 665 - 1480 ug/L 1239   Differential Method Unknown Automated   RBC Folate Latest Ref Range: 280 - 791 ng/mL 696   Zinc, Serum-ALT Latest Ref Range: 60 - 130 ug/dL 62     Lab Results   Component Value Date    WBC 4.55 03/12/2025    HGB 12.4 (L) 03/12/2025    HCT 37.7 (L) 03/12/2025     (H) 03/12/2025     03/12/2025     Lab Results   Component Value Date    IRON 88 03/12/2025    TIBC 383 03/12/2025    FERRITIN 16 (L) 03/12/2025     Results for TRESSA MCGRATH JR. (MRN [id number]) as of 9/26/2019 13:55   Ref. Range 9/9/2019 15:10   Protein, Serum Latest Ref Range: 6.0 - 8.4 g/dL 6.4   RBC Folate Latest Ref Range: 280 - 791 ng/mL 697   Zinc, Serum-ALT Latest Ref Range: 60 - 130 ug/dL 46 (L)       Results for TRESSA MCGRATH JR. (MRN [id number]) as of 1/6/2020 12:46   Ref. Range 9/9/2019 15:10 12/30/2019 15:53   Zinc, Serum-ALT Latest Ref Range: 60 - 130 ug/dL 46 (L) 45 (L)       5/2019 Outside Facility [facility]  Indication: Dilatation of abdominal aorta.    Prior studies reviewed: None available.  Procedure: One or more of the following dose lowering techniques were utilized: Automated exposure control, iterative reconstruction technique, and/or adjustment of the mA and kV according to patient size.   Contiguous 1 mm images were obtained through the abdominal aorta and the pelvic vessels down through the bifurcation of the common femoral arteries. Additional 1 mm images were obtained after 100 ml intravenous contrast. The raw data was then sent to a separate post processing workstation where 3-D MIP images were obtained and vessel analysis was performed.    Findings: The abdominal aorta is normal in caliber. There is no significant atherosclerotic disease. No stenosis or aneurysm is  seen.    The celiac, SMA and KAREN are patent. The renal arteries are also patent.    In the pelvis, the iliac vessels and the common femoral artery are patent.    Other CT findings include a 1.5 cm rounded lesion in the right adrenal gland, most likely an adenoma. In the pelvis, there was a small amount of free fluid.    IMPRESSION:   1. No significant vascular disease is seen in the abdomen or pelvis. There is no atherosclerosis or aneurysm.  2. Probable adenoma in the right adrenal gland.  3. There is a small amount of free fluid in the pelvis, of uncertain etiology.               SPEP 5/22/23  Signed on 05/24/23 at 12:12   Normal total protein, normal pattern.     [facility] 3/28/24  Ferritin 7  Fe 96   TIBC 341     Hb 11.4g/dl  Hct 34.2%    Plt 164     Lab Results   Component Value Date    WBC 4.55 03/12/2025    HGB 12.4 (L) 03/12/2025    HCT 37.7 (L) 03/12/2025     (H) 03/12/2025     03/12/2025     Lab Results   Component Value Date    IRON 88 03/12/2025    TRANSFERRIN 259 03/12/2025    TIBC 383 03/12/2025    FESATURATED 23 03/12/2025              Assessment:       1. Iron deficiency anemia, unspecified iron deficiency anemia type    2. Anemia in chronic kidney disease, unspecified CKD stage    3. Chronic kidney disease, unspecified CKD stage    4. Elevated serum immunoglobulin free light chains            Plan:   1. 54 -year-old with history type 2 diabetes mellitus with diabetic chronic kidney disease,Chronic kidney disease stage III   Pt has a mild anemia dating back to at least 2016   CKD likely contributing factor  Has hx of iron def -recent Fe parameters wnl      hemoglobin of  12.4 g/dL on 3/12/25    Ferritin 16  S/p GI eval ( Dr. Rodriguez) 2019?- no bleeding source  GI eval c scope planned in near future   Plan IV Fe     CBC and FE studies in 4mos     2. MGUS with renal impairment  [facility] labs reviewed  No signs of disease progression  Monitor  Elevated serum FLC likely 2/2 renal impairment      3. Cr level 1.8 to 1.6mg/dL   Recent Cr level 1.9mg/dL on 12/18/24   Follow-up with Nephrology        Visit today included increased complexity associated with the care of the episodic problem anemia in CKD  addressed and managing the longitudinal care of the patient due to the serious and/or complex managed problem(s) anemia in CKD     Follow up with PCP for med mgmt    Plan IV Fe x 3 -  CBC and FE studies in 4 mos    Cc Emil Montejo M.D.        Alpesh Rodriguez II, MD

## 2025-03-20 ENCOUNTER — TELEPHONE (OUTPATIENT)
Dept: INFUSION THERAPY | Facility: HOSPITAL | Age: 55
End: 2025-03-20
Payer: MEDICARE

## 2025-07-02 ENCOUNTER — LAB VISIT (OUTPATIENT)
Dept: LAB | Facility: HOSPITAL | Age: 55
End: 2025-07-02
Attending: INTERNAL MEDICINE
Payer: MEDICARE

## 2025-07-02 DIAGNOSIS — N18.9 ANEMIA IN CHRONIC KIDNEY DISEASE, UNSPECIFIED CKD STAGE: ICD-10-CM

## 2025-07-02 DIAGNOSIS — I10 ESSENTIAL HYPERTENSION: ICD-10-CM

## 2025-07-02 DIAGNOSIS — N25.81 SECONDARY RENAL HYPERPARATHYROIDISM: ICD-10-CM

## 2025-07-02 DIAGNOSIS — D63.1 ANEMIA IN CHRONIC KIDNEY DISEASE, UNSPECIFIED CKD STAGE: ICD-10-CM

## 2025-07-02 DIAGNOSIS — N18.32 STAGE 3B CHRONIC KIDNEY DISEASE: ICD-10-CM

## 2025-07-02 LAB
BACTERIA #/AREA URNS AUTO: NORMAL /HPF
BILIRUB UR QL STRIP.AUTO: NEGATIVE
CLARITY UR: CLEAR
COLOR UR AUTO: COLORLESS
CREAT UR-MCNC: 17.3 MG/DL (ref 23–375)
GLUCOSE UR QL STRIP: ABNORMAL
HGB UR QL STRIP: NEGATIVE
KETONES UR QL STRIP: NEGATIVE
LEUKOCYTE ESTERASE UR QL STRIP: NEGATIVE
MICROSCOPIC COMMENT: NORMAL
NITRITE UR QL STRIP: NEGATIVE
PH UR STRIP: 6 [PH]
PROT UR QL STRIP: NEGATIVE
PROT UR-MCNC: 15 MG/DL
PROT/CREAT UR: 0.87 MG/G{CREAT}
RBC #/AREA URNS AUTO: 0 /HPF (ref 0–4)
SP GR UR STRIP: <1.005
UROBILINOGEN UR STRIP-ACNC: NEGATIVE EU/DL
WBC #/AREA URNS AUTO: 0 /HPF (ref 0–5)
YEAST UR QL AUTO: NORMAL /HPF

## 2025-07-02 PROCEDURE — 81003 URINALYSIS AUTO W/O SCOPE: CPT

## 2025-07-02 PROCEDURE — 84156 ASSAY OF PROTEIN URINE: CPT

## 2025-07-08 ENCOUNTER — LAB VISIT (OUTPATIENT)
Dept: LAB | Facility: HOSPITAL | Age: 55
End: 2025-07-08
Attending: INTERNAL MEDICINE
Payer: MEDICARE

## 2025-07-08 DIAGNOSIS — N25.81 SECONDARY RENAL HYPERPARATHYROIDISM: ICD-10-CM

## 2025-07-08 DIAGNOSIS — D63.1 ANEMIA IN CHRONIC KIDNEY DISEASE, UNSPECIFIED CKD STAGE: ICD-10-CM

## 2025-07-08 DIAGNOSIS — N18.32 STAGE 3B CHRONIC KIDNEY DISEASE: ICD-10-CM

## 2025-07-08 DIAGNOSIS — I10 ESSENTIAL HYPERTENSION: ICD-10-CM

## 2025-07-08 DIAGNOSIS — N18.9 ANEMIA IN CHRONIC KIDNEY DISEASE, UNSPECIFIED CKD STAGE: ICD-10-CM

## 2025-07-08 LAB
ABSOLUTE EOSINOPHIL (OHS): 0.18 K/UL
ABSOLUTE MONOCYTE (OHS): 0.56 K/UL (ref 0.3–1)
ABSOLUTE NEUTROPHIL COUNT (OHS): 2.56 K/UL (ref 1.8–7.7)
ALBUMIN SERPL BCP-MCNC: 3.7 G/DL (ref 3.5–5.2)
ANION GAP (OHS): 8 MMOL/L (ref 8–16)
BASOPHILS # BLD AUTO: 0.04 K/UL
BASOPHILS NFR BLD AUTO: 0.9 %
BUN SERPL-MCNC: 35 MG/DL (ref 6–20)
CALCIUM SERPL-MCNC: 9.1 MG/DL (ref 8.7–10.5)
CHLORIDE SERPL-SCNC: 110 MMOL/L (ref 95–110)
CO2 SERPL-SCNC: 23 MMOL/L (ref 23–29)
CREAT SERPL-MCNC: 1.7 MG/DL (ref 0.5–1.4)
ERYTHROCYTE [DISTWIDTH] IN BLOOD BY AUTOMATED COUNT: 12.7 % (ref 11.5–14.5)
GFR SERPLBLD CREATININE-BSD FMLA CKD-EPI: 47 ML/MIN/1.73/M2
GLUCOSE SERPL-MCNC: 130 MG/DL (ref 70–110)
HCT VFR BLD AUTO: 38.2 % (ref 40–54)
HGB BLD-MCNC: 12.4 GM/DL (ref 14–18)
IMM GRANULOCYTES # BLD AUTO: 0.01 K/UL (ref 0–0.04)
IMM GRANULOCYTES NFR BLD AUTO: 0.2 % (ref 0–0.5)
LYMPHOCYTES # BLD AUTO: 1.09 K/UL (ref 1–4.8)
MCH RBC QN AUTO: 32.5 PG (ref 27–31)
MCHC RBC AUTO-ENTMCNC: 32.5 G/DL (ref 32–36)
MCV RBC AUTO: 100 FL (ref 82–98)
NUCLEATED RBC (/100WBC) (OHS): 0 /100 WBC
PHOSPHATE SERPL-MCNC: 3.7 MG/DL (ref 2.7–4.5)
PLATELET # BLD AUTO: 181 K/UL (ref 150–450)
PMV BLD AUTO: 11.5 FL (ref 9.2–12.9)
POTASSIUM SERPL-SCNC: 3.9 MMOL/L (ref 3.5–5.1)
PTH-INTACT SERPL-MCNC: 123.8 PG/ML (ref 9–77)
RBC # BLD AUTO: 3.82 M/UL (ref 4.6–6.2)
RELATIVE EOSINOPHIL (OHS): 4.1 %
RELATIVE LYMPHOCYTE (OHS): 24.5 % (ref 18–48)
RELATIVE MONOCYTE (OHS): 12.6 % (ref 4–15)
RELATIVE NEUTROPHIL (OHS): 57.7 % (ref 38–73)
SODIUM SERPL-SCNC: 141 MMOL/L (ref 136–145)
URATE SERPL-MCNC: 6.7 MG/DL (ref 3.4–7)
WBC # BLD AUTO: 4.44 K/UL (ref 3.9–12.7)

## 2025-07-08 PROCEDURE — 36415 COLL VENOUS BLD VENIPUNCTURE: CPT

## 2025-07-08 PROCEDURE — 82040 ASSAY OF SERUM ALBUMIN: CPT

## 2025-07-08 PROCEDURE — 85025 COMPLETE CBC W/AUTO DIFF WBC: CPT

## 2025-07-08 PROCEDURE — 84550 ASSAY OF BLOOD/URIC ACID: CPT

## 2025-07-08 PROCEDURE — 83970 ASSAY OF PARATHORMONE: CPT

## 2025-07-09 ENCOUNTER — OFFICE VISIT (OUTPATIENT)
Dept: NEPHROLOGY | Facility: CLINIC | Age: 55
End: 2025-07-09
Payer: MEDICARE

## 2025-07-09 VITALS
BODY MASS INDEX: 16.52 KG/M2 | OXYGEN SATURATION: 97 % | HEART RATE: 81 BPM | DIASTOLIC BLOOD PRESSURE: 72 MMHG | RESPIRATION RATE: 18 BRPM | HEIGHT: 63 IN | WEIGHT: 93.25 LBS | SYSTOLIC BLOOD PRESSURE: 126 MMHG

## 2025-07-09 DIAGNOSIS — N25.81 SECONDARY RENAL HYPERPARATHYROIDISM: ICD-10-CM

## 2025-07-09 DIAGNOSIS — N18.9 ANEMIA IN CHRONIC KIDNEY DISEASE, UNSPECIFIED CKD STAGE: ICD-10-CM

## 2025-07-09 DIAGNOSIS — D63.1 ANEMIA IN CHRONIC KIDNEY DISEASE, UNSPECIFIED CKD STAGE: ICD-10-CM

## 2025-07-09 DIAGNOSIS — N18.32 STAGE 3B CHRONIC KIDNEY DISEASE: Primary | ICD-10-CM

## 2025-07-09 DIAGNOSIS — I10 ESSENTIAL HYPERTENSION: ICD-10-CM

## 2025-07-09 PROCEDURE — 99215 OFFICE O/P EST HI 40 MIN: CPT | Mod: PBBFAC | Performed by: INTERNAL MEDICINE

## 2025-07-09 PROCEDURE — 99999 PR PBB SHADOW E&M-EST. PATIENT-LVL V: CPT | Mod: PBBFAC,,, | Performed by: INTERNAL MEDICINE

## 2025-07-09 RX ORDER — NYSTATIN 100000 [USP'U]/G
POWDER TOPICAL 3 TIMES DAILY
COMMUNITY
Start: 2025-06-18

## 2025-07-09 RX ORDER — INSULIN GLARGINE 100 [IU]/ML
INJECTION, SOLUTION SUBCUTANEOUS
COMMUNITY

## 2025-07-09 NOTE — PROGRESS NOTES
"[facility]  [address] [facility] [address], [address]  [place]  [phone]    PCP: Fabián John MD  Hem/Onc: Lorna   Cardiology: Rose       HPI: Jarrett Odonnell Jr. is a 54 y.o. male with Aidan syndrome, T2DM, CKD, DLD, and BPH who is here for established patient evaluation of Chronic Kidney Disease  Initial visit was 9/18/24. His baseline Cr has been 1.6-1.8 with GFR 44-51% since 12/2021. Cr 1.8 today. UA with proteinuria since at least 2020. He is currently on lisinopril 20mg daily and Jardiance 10mg daily.   He was diagnosed with T2DM in 2000. Mother recalls his glucose being in the 700s around time of diagnosis. Glucose has since been well-controlled. Chart review notable for A1c </= 6% since at least 2020.   He denies h/o HTN; only on lisinopril for diabetic nephropathy.  No h/o gout, kidney stones, or significant NSAID use.   His anemia is managed by Dr. Rothman. He was started on ferrous sulfate which caused increased BM (up to 4 q AM).   Mother provides most of history.      9/18/24: no medication changes.    Interval Hx:   LV 12/19/24: no medication changes.  Today he reports to be doing okay. He went to the ED on 6/18/25 for scrotal irritation +/- infection. He was prescribed Keflex and topical creams. Symptoms have since improved but not back to baseline. Mother notes prior to this, his Jardiance dose was increased from 10mg to 25mg daily and thinks it might be related.   Their endocrinologist is leaving and their next visit is 5 months from now with a new provider. They note insulin brand was recently adjusted and glucose has been uncontrolled.       ROS:  Complete ROS otherwise negative except as indicated above.       Current Medications[1]      Vitals: Blood pressure 126/72, pulse 81, resp. rate 18, height 5' 3" (1.6 m), weight 42.3 kg (93 lb 4.1 oz), SpO2 97%. Body mass index is 16.52 kg/m².    Physical Exam  Vitals reviewed.   Constitutional:       General: He is awake. He is not in acute " distress.     Appearance: Normal appearance. He is well-developed.   HENT:      Head: Normocephalic and atraumatic.      Nose: Nose normal.      Mouth/Throat:      Mouth: Mucous membranes are moist.   Eyes:      Extraocular Movements: Extraocular movements intact.      Conjunctiva/sclera: Conjunctivae normal.   Pulmonary:      Effort: Pulmonary effort is normal.   Musculoskeletal:         General: No tenderness or signs of injury.      Right lower leg: No edema.      Left lower leg: No edema.   Skin:     General: Skin is warm and dry.      Findings: No erythema or rash.   Neurological:      General: No focal deficit present.      Mental Status: He is alert. Mental status is at baseline.   Psychiatric:         Mood and Affect: Mood normal.         Behavior: Behavior normal.           Labs/Imaging:  Sodium   Date Value Ref Range Status   07/08/2025 141 136 - 145 mmol/L Final   05/05/2025 141 135 - 146 mmol/L Final   12/18/2024 140 136 - 145 mmol/L Final   10/17/2024 140 135 - 146 mmol/L Final   09/11/2024 143 136 - 145 mmol/L Final   09/05/2024 140 136 - 145 mmol/L Final     Potassium   Date Value Ref Range Status   07/08/2025 3.9 3.5 - 5.1 mmol/L Final   05/05/2025 4.0 3.5 - 5.3 mmol/L Final   12/18/2024 3.9 3.5 - 5.1 mmol/L Final   10/17/2024 4.8 3.5 - 5.3 mmol/L Final   09/11/2024 4.0 3.5 - 5.1 mmol/L Final   09/05/2024 4.1 3.5 - 5.1 mmol/L Final     Chloride   Date Value Ref Range Status   07/08/2025 110 95 - 110 mmol/L Final   12/18/2024 108 95 - 110 mmol/L Final   09/11/2024 110 95 - 110 mmol/L Final   09/05/2024 108 95 - 110 mmol/L Final     CO2   Date Value Ref Range Status   07/08/2025 23 23 - 29 mmol/L Final   12/18/2024 25 23 - 29 mmol/L Final   09/11/2024 22 (L) 23 - 29 mmol/L Final   09/05/2024 24 23 - 29 mmol/L Final     Carbon Dioxide   Date Value Ref Range Status   05/05/2025 28 20 - 32 mmol/L Final   10/17/2024 23 20 - 32 mmol/L Final     BUN   Date Value Ref Range Status   07/08/2025 35 (H) 6 - 20  mg/dL Final   12/18/2024 32 (H) 6 - 20 mg/dL Final   09/11/2024 24 (H) 6 - 20 mg/dL Final   09/05/2024 28 (H) 6 - 20 mg/dL Final     Blood Urea Nitrogen   Date Value Ref Range Status   05/05/2025 32 (H) 7 - 25 mg/dL Final   10/17/2024 32 (H) 7 - 25 mg/dL Final     Creatinine   Date Value Ref Range Status   07/08/2025 1.7 (H) 0.5 - 1.4 mg/dL Final   05/05/2025 1.84 (H) 0.70 - 1.30 mg/dL Final   12/18/2024 1.9 (H) 0.5 - 1.4 mg/dL Final   10/17/2024 1.67 (H) 0.70 - 1.30 mg/dL Final   09/11/2024 1.8 (H) 0.5 - 1.4 mg/dL Final   09/05/2024 1.8 (H) 0.5 - 1.4 mg/dL Final     eGFR   Date Value Ref Range Status   07/08/2025 47 (L) >60 mL/min/1.73/m2 Final     Comment:     Estimated GFR calculated using the CKD-EPI creatinine (2021) equation.   05/05/2025 43 (L) > OR = 60 mL/min/1.73m2 Final   12/18/2024 41 (A) >60 mL/min/1.73 m^2 Final   10/17/2024 48 (L) > OR = 60 mL/min/1.73m2 Final   09/11/2024 43.9 (A) >60 mL/min/1.73 m^2 Final   09/05/2024 44 (A) >60 mL/min/1.73 m^2 Final     Glucose   Date Value Ref Range Status   09/20/2023 119 (H) 65 - 99 mg/dL Final     Calcium   Date Value Ref Range Status   07/08/2025 9.1 8.7 - 10.5 mg/dL Final   05/05/2025 9.4 8.6 - 10.3 mg/dL Final   12/18/2024 9.2 8.7 - 10.5 mg/dL Final   10/17/2024 9.4 8.6 - 10.3 mg/dL Final   09/11/2024 9.9 8.7 - 10.5 mg/dL Final   09/05/2024 9.3 8.7 - 10.5 mg/dL Final     Phosphorus Level   Date Value Ref Range Status   07/08/2025 3.7 2.7 - 4.5 mg/dL Final     Phosphorus   Date Value Ref Range Status   12/18/2024 3.3 2.7 - 4.5 mg/dL Final   09/05/2024 3.1 2.7 - 4.5 mg/dL Final   09/21/2020 3.1 2.7 - 4.5 mg/dL Final     Albumin   Date Value Ref Range Status   07/08/2025 3.7 3.5 - 5.2 g/dL Final   12/18/2024 3.7 3.5 - 5.2 g/dL Final   09/11/2024 4.0 3.5 - 5.2 g/dL Final   09/05/2024 3.6 3.5 - 5.2 g/dL Final     Albumin Level   Date Value Ref Range Status   05/05/2025 4.4 3.6 - 5.1 g/dL Final   10/17/2024 4.2 3.6 - 5.1 g/dL Final       PTH, Intact   Date Value  Ref Range Status   12/18/2024 81.5 (H) 9.0 - 77.0 pg/mL Final   09/05/2024 101.7 (H) 9.0 - 77.0 pg/mL Final   09/21/2020 61.2 9.0 - 77.0 pg/mL Final     PTH Intact   Date Value Ref Range Status   07/08/2025 123.8 (H) 9.0 - 77.0 pg/mL Final     Comment:     The testing method is chemiluminescent microparticle immunoassay manufactured by [employer] and performed on the [employer] or Kaixin001 System. Values obtained with different assay manufactures for methods may be different and cannot be used interchangeably.     Uric Acid   Date Value Ref Range Status   12/18/2024 6.7 3.4 - 7.0 mg/dL Final   09/05/2024 6.9 3.4 - 7.0 mg/dL Final   09/21/2020 6.8 3.4 - 7.0 mg/dL Final       Hemoglobin   Date Value Ref Range Status   03/12/2025 12.4 (L) 14.0 - 18.0 g/dL Final   12/18/2024 12.5 (L) 14.0 - 18.0 g/dL Final   10/10/2024 12.5 (L) 14.0 - 18.0 g/dL Final     HGB   Date Value Ref Range Status   07/08/2025 12.4 (L) 14.0 - 18.0 gm/dL Final       Urine Protein/Creatinine Ratio   Date Value Ref Range Status   07/02/2025 0.87 (H) <=0.20 Final     Prot/Creat Ratio, Urine   Date Value Ref Range Status   12/18/2024 0.42 (H) 0.00 - 0.20 Final   09/05/2024 0.45 (H) 0.00 - 0.20 Final   12/03/2021 0.56 (H) 0.00 - 0.20 Final           Renal US: 12/18/24:   Right kidney: The right kidney measures 8.8 cm. No cortical thinning or loss of corticomedullary distinction. Resistive index measures 0.72.  Mild caliectasis.  No solid mass or stone.     Left kidney: The left kidney measures 8.2 cm. No cortical thinning or loss of corticomedullary distinction.  Resistive index measures 0.62.  Mild caliectasis.  No stones or solid mass.        Impression:  Mild bilateral renal caliectasis.      Impression/Plan:    Stage 3b chronic kidney disease  - baseline Cr 1.6-1.8 with GFR 44-51% since 12/2021; clinically due to diabetic nephropathy  - Cr 1.7 today; stable and at baseline  - UPCR 0.42 --> 0.87; worsening despite increased dose of  "Jardiance. Glucose has also been uncontrolled at home.   - continue lisinopril 20mg daily. Low threshold to increase dose if proteinuria continues to worsen  - recent scrotal irritation +/- infection. Family to discuss use of SGLT2i with Endo ASAP; might have to discontinue medication  - improve glycemic control. Advised patient/mother not to wait 5 months until next Endo appt; rather to call today to discuss hyperglycemia and recent scrotal infection  - renal US c/w advanced CKD and mild caliectasis. No report of urinary retention. Will repeat US if Cr worsens  - good water intake. Low salt diet. Avoid NSAIDs.    Anemia in chronic kidney disease  - associated with KELI  - hgb 13.3 --> 12.5 --> 12.4; stable and at goal for CKD  - continue to f/u with hem/onc    Secondary renal hyperparathyroidism  -  --> 81 --> 124; stable. CCa and phos normal. Will trend.     Essential hypertension  - patient does not have HTN  - monitor for hypotension while on ACEi      Visit today included increased complexity associated with the care of the episodic problem SHPTH addressed and managing the longitudinal care of the patient due to the serious and/or complex managed problem(s) CKD.      Treatment options and plan were discussed with the patient and/or caregiver.   RTC 3 months.       Gale Hickman MD  [facility]  [phone]         [1]   Current Outpatient Medications:     atorvastatin (LIPITOR) 10 MG tablet, Take 10 mg by mouth once daily., Disp: , Rfl:     BD AMANDA 2ND GEN PEN NEEDLE 32 gauge x 5/32" Ndle, USE FOUR TIMES DAILY, Disp: 100 each, Rfl: 4    blood sugar diagnostic (BLOOD GLUCOSE TEST) Strp, by Misc.(Non-Drug; Combo Route) route. I strip 4 times a day., Disp: , Rfl:     fluocinolone acetonide oiL 0.01 % Drop, Place 1 drop into both ears as needed. Instill 4-5 drops into both ears twice a week for 2 months until follow up appointment. (Patient taking differently: Place 1 drop into both ears as needed. " "Instill 4-5 drops into both ears three times a week for 2 months until follow up appointment.), Disp: , Rfl:     insulin glargine (BASAGLAR TEMPO PEN,U-100,INSLN) 100 unit/mL (3 mL) inps, inject 8 units a day, Disp: , Rfl:     insulin syringe-needle U-100 0.5 mL 29 gauge x 1/2" Syrg, Inject 8 Units/day into the muscle once daily. Inject 8 units into the skin every morning. (Patient taking differently: Inject 8 Units/day into the muscle once daily. Still inject 8 units into the skin every night . Medication has a different name now. (BASAGLAR)), Disp: , Rfl:     JANUVIA 100 mg Tab, Take 50 mg by mouth once daily., Disp: , Rfl:     JARDIANCE 10 mg tablet, Take 10 mg by mouth once daily. (Patient taking differently: Take 10 mg by mouth once daily. Mg has been up to 25.), Disp: , Rfl:     nystatin (MYCOSTATIN) powder, Apply topically 3 (three) times daily., Disp: , Rfl:     omeprazole (PRILOSEC) 20 MG capsule, Take 20 mg by mouth every morning., Disp: , Rfl:     pen needle, diabetic 32 gauge x 5/32" Ndle, BD Ultra-Fine Lauren Pen Needle 32 gauge x 5/32", Disp: , Rfl:     simethicone (MYLICON) 125 MG chewable tablet, Take 125 mg by mouth every 6 (six) hours as needed for Flatulence., Disp: , Rfl:     triamcinolone acetonide 0.1% (KENALOG) 0.1 % ointment, Apply 1 Tube topically as needed., Disp: , Rfl:     ferrous sulfate (FEOSOL) 325 mg (65 mg iron) Tab tablet, TAKE 1 TABLET BY MOUTH EVERY DAY (Patient not taking: Reported on 7/9/2025), Disp: 90 tablet, Rfl: 0    insulin aspart U-100 (NOVOLOG) 100 unit/mL injection, Novolog U-100 Insulin aspart 100 unit/mL subcutaneous solution  USE ON SLIDING SCALE AS DIRECTED MAXIMUM OF SIX UNITS (Patient not taking: Reported on 7/9/2025), Disp: , Rfl:     lisinopril (PRINIVIL,ZESTRIL) 5 MG tablet, Take 20 mg by mouth once daily., Disp: , Rfl:     nateglinide (STARLIX) 60 MG tablet, 120 mg. Take 1 tablet in the morning and 1 tablet at noon and 1 tablet in the evening by mouth., Disp: , " Rfl:     tamsulosin (FLOMAX) 0.4 mg Cap, Take 1 capsule by mouth once daily. (Patient not taking: Reported on 7/9/2025), Disp: , Rfl:

## 2025-07-09 NOTE — PATIENT INSTRUCTIONS
Make an appt with the Endocrinologist soon to discuss high sugar levels and also to see if we should stop (or decrease dose of) Jardiance.    Stay hydrated.

## 2025-07-11 NOTE — ASSESSMENT & PLAN NOTE
- baseline Cr 1.6-1.8 with GFR 44-51% since 12/2021; clinically due to diabetic nephropathy  - Cr 1.7 today; stable and at baseline  - UPCR 0.42 --> 0.87; worsening despite increased dose of Jardiance. Glucose has also been uncontrolled at home.   - continue lisinopril 20mg daily. Low threshold to increase dose if proteinuria continues to worsen  - recent scrotal irritation +/- infection. Family to discuss use of SGLT2i with Endo ASAP; might have to discontinue medication  - improve glycemic control. Advised patient/mother not to wait 5 months until next Endo appt; rather to call today to discuss hyperglycemia and recent scrotal infection  - renal US c/w advanced CKD and mild caliectasis. No report of urinary retention. Will repeat US if Cr worsens  - good water intake. Low salt diet. Avoid NSAIDs.

## 2025-07-11 NOTE — ASSESSMENT & PLAN NOTE
- associated with KELI  - hgb 13.3 --> 12.5 --> 12.4; stable and at goal for CKD  - continue to f/u with hem/onc

## 2025-08-29 ENCOUNTER — LAB VISIT (OUTPATIENT)
Dept: LAB | Facility: HOSPITAL | Age: 55
End: 2025-08-29
Attending: INTERNAL MEDICINE
Payer: MEDICARE

## 2025-08-29 DIAGNOSIS — D50.9 IRON DEFICIENCY ANEMIA, UNSPECIFIED IRON DEFICIENCY ANEMIA TYPE: ICD-10-CM

## 2025-08-29 LAB
FERRITIN SERPL-MCNC: 12.4 NG/ML (ref 20–300)
IRON SATN MFR SERPL: 14 % (ref 20–50)
IRON SERPL-MCNC: 60 UG/DL (ref 45–160)
TIBC SERPL-MCNC: 428 UG/DL (ref 250–450)
TRANSFERRIN SERPL-MCNC: 289 MG/DL (ref 200–375)

## 2025-08-29 PROCEDURE — 82728 ASSAY OF FERRITIN: CPT

## 2025-08-29 PROCEDURE — 84466 ASSAY OF TRANSFERRIN: CPT

## 2025-08-29 PROCEDURE — 36415 COLL VENOUS BLD VENIPUNCTURE: CPT

## 2025-09-03 DIAGNOSIS — D50.9 IRON DEFICIENCY ANEMIA, UNSPECIFIED IRON DEFICIENCY ANEMIA TYPE: Primary | ICD-10-CM

## 2025-09-04 ENCOUNTER — OFFICE VISIT (OUTPATIENT)
Dept: HEMATOLOGY/ONCOLOGY | Facility: CLINIC | Age: 55
End: 2025-09-04
Payer: MEDICARE

## 2025-09-04 VITALS
HEART RATE: 90 BPM | DIASTOLIC BLOOD PRESSURE: 94 MMHG | TEMPERATURE: 99 F | OXYGEN SATURATION: 100 % | HEIGHT: 63 IN | SYSTOLIC BLOOD PRESSURE: 143 MMHG | BODY MASS INDEX: 16.8 KG/M2 | WEIGHT: 94.81 LBS

## 2025-09-04 DIAGNOSIS — N18.9 ANEMIA IN CHRONIC KIDNEY DISEASE, UNSPECIFIED CKD STAGE: ICD-10-CM

## 2025-09-04 DIAGNOSIS — D50.9 IRON DEFICIENCY ANEMIA, UNSPECIFIED IRON DEFICIENCY ANEMIA TYPE: Primary | ICD-10-CM

## 2025-09-04 DIAGNOSIS — R76.8 ELEVATED SERUM IMMUNOGLOBULIN FREE LIGHT CHAINS: ICD-10-CM

## 2025-09-04 DIAGNOSIS — N18.9 CHRONIC KIDNEY DISEASE, UNSPECIFIED CKD STAGE: ICD-10-CM

## 2025-09-04 DIAGNOSIS — D63.1 ANEMIA IN CHRONIC KIDNEY DISEASE, UNSPECIFIED CKD STAGE: ICD-10-CM

## 2025-09-04 PROCEDURE — 99214 OFFICE O/P EST MOD 30 MIN: CPT | Mod: PBBFAC | Performed by: INTERNAL MEDICINE

## 2025-09-04 PROCEDURE — 99213 OFFICE O/P EST LOW 20 MIN: CPT | Mod: S$PBB,,, | Performed by: INTERNAL MEDICINE

## 2025-09-04 PROCEDURE — 99999 PR PBB SHADOW E&M-EST. PATIENT-LVL IV: CPT | Mod: PBBFAC,,, | Performed by: INTERNAL MEDICINE
